# Patient Record
Sex: FEMALE | Race: WHITE | NOT HISPANIC OR LATINO | Employment: OTHER | URBAN - METROPOLITAN AREA
[De-identification: names, ages, dates, MRNs, and addresses within clinical notes are randomized per-mention and may not be internally consistent; named-entity substitution may affect disease eponyms.]

---

## 2023-02-13 PROBLEM — G89.4 CHRONIC PAIN SYNDROME: Status: ACTIVE | Noted: 2023-02-13

## 2023-02-13 PROBLEM — G89.29 CHRONIC LEFT SI JOINT PAIN: Status: ACTIVE | Noted: 2023-02-13

## 2023-02-13 PROBLEM — R91.1 LUNG NODULE SEEN ON IMAGING STUDY: Status: ACTIVE | Noted: 2023-02-13

## 2023-02-13 PROBLEM — M79.2 PERIPHERAL NEUROPATHIC PAIN: Status: ACTIVE | Noted: 2023-02-13

## 2023-02-13 PROBLEM — R22.41 MASS OF LEG, RIGHT: Status: ACTIVE | Noted: 2023-02-13

## 2023-02-13 PROBLEM — M79.89 SOFT TISSUE MASS: Status: ACTIVE | Noted: 2023-02-13

## 2023-02-13 PROBLEM — I83.813 VARICOSE VEINS OF BOTH LOWER EXTREMITIES WITH PAIN: Status: ACTIVE | Noted: 2023-02-13

## 2023-02-13 PROBLEM — N39.0 CHRONIC UTI: Status: ACTIVE | Noted: 2023-02-13

## 2023-02-13 PROBLEM — L53.9 BREAST ERYTHEMA: Status: ACTIVE | Noted: 2023-02-13

## 2023-02-13 PROBLEM — R10.2 VAGINAL PAIN: Status: ACTIVE | Noted: 2023-02-13

## 2023-02-13 PROBLEM — M50.30 DEGENERATION OF INTERVERTEBRAL DISC OF CERVICAL REGION: Status: ACTIVE | Noted: 2023-02-13

## 2023-02-13 PROBLEM — M46.1 SACROILIITIS (CMS-HCC): Status: ACTIVE | Noted: 2023-02-13

## 2023-02-13 PROBLEM — M85.80 OSTEOPENIA: Status: ACTIVE | Noted: 2023-02-13

## 2023-02-13 PROBLEM — R39.9 UTI SYMPTOMS: Status: ACTIVE | Noted: 2023-02-13

## 2023-02-13 PROBLEM — K27.3 PEPTIC ULCER, ACUTE: Status: ACTIVE | Noted: 2023-02-13

## 2023-02-13 PROBLEM — M25.552 LEFT HIP PAIN: Status: ACTIVE | Noted: 2023-02-13

## 2023-02-13 PROBLEM — K30 GASTRIC MOTILITY DISORDER: Status: ACTIVE | Noted: 2023-02-13

## 2023-02-13 PROBLEM — R39.16 URINARY STRAINING: Status: ACTIVE | Noted: 2023-02-13

## 2023-02-13 PROBLEM — G25.81 RLS (RESTLESS LEGS SYNDROME): Status: ACTIVE | Noted: 2023-02-13

## 2023-02-13 PROBLEM — N64.59 INVERSION, NIPPLE: Status: ACTIVE | Noted: 2023-02-13

## 2023-02-13 PROBLEM — N76.0 VAGINITIS: Status: ACTIVE | Noted: 2023-02-13

## 2023-02-13 PROBLEM — G47.00 INSOMNIA: Status: ACTIVE | Noted: 2023-02-13

## 2023-02-13 PROBLEM — R93.1 AGATSTON CAC SCORE, <100: Status: ACTIVE | Noted: 2023-02-13

## 2023-02-13 PROBLEM — M53.3 CHRONIC LEFT SI JOINT PAIN: Status: ACTIVE | Noted: 2023-02-13

## 2023-02-13 PROBLEM — R11.0 NAUSEA IN ADULT: Status: ACTIVE | Noted: 2023-02-13

## 2023-02-13 PROBLEM — C50.911 INVASIVE DUCTAL CARCINOMA OF BREAST, RIGHT (MULTI): Status: ACTIVE | Noted: 2023-02-13

## 2023-02-13 PROBLEM — M51.369 DEGENERATION OF INTERVERTEBRAL DISC OF LUMBAR REGION: Status: ACTIVE | Noted: 2023-02-13

## 2023-02-13 PROBLEM — I87.2 VENOUS INSUFFICIENCY: Status: ACTIVE | Noted: 2023-02-13

## 2023-02-13 PROBLEM — R10.2 PELVIC PAIN IN FEMALE: Status: ACTIVE | Noted: 2023-02-13

## 2023-02-13 PROBLEM — M79.18 BUTTOCK PAIN: Status: ACTIVE | Noted: 2023-02-13

## 2023-02-13 PROBLEM — N39.0 URINARY TRACT INFECTION: Status: ACTIVE | Noted: 2023-02-13

## 2023-02-13 PROBLEM — G62.9 SMALL FIBER NEUROPATHY: Status: ACTIVE | Noted: 2023-02-13

## 2023-02-13 PROBLEM — M79.18 MYOFASCIAL PAIN: Status: ACTIVE | Noted: 2023-02-13

## 2023-02-13 PROBLEM — M21.70 LEG LENGTH DISCREPANCY: Status: ACTIVE | Noted: 2023-02-13

## 2023-02-13 PROBLEM — M51.36 DEGENERATION OF INTERVERTEBRAL DISC OF LUMBAR REGION: Status: ACTIVE | Noted: 2023-02-13

## 2023-02-13 PROBLEM — N84.0 UTERINE POLYP: Status: ACTIVE | Noted: 2023-02-13

## 2023-02-13 PROBLEM — N95.2 ATROPHY OF VAGINA: Status: ACTIVE | Noted: 2023-02-13

## 2023-02-13 PROBLEM — R39.11 URINARY HESITANCY: Status: ACTIVE | Noted: 2023-02-13

## 2023-02-13 PROBLEM — M54.50 LOW BACK PAIN: Status: ACTIVE | Noted: 2023-02-13

## 2023-02-13 PROBLEM — J31.0 CHRONIC RHINITIS: Status: ACTIVE | Noted: 2023-02-13

## 2023-02-13 PROBLEM — E78.00 HYPERCHOLESTEROLEMIA: Status: ACTIVE | Noted: 2023-02-13

## 2023-02-13 RX ORDER — GABAPENTIN 100 MG/1
1 CAPSULE ORAL 2 TIMES DAILY
COMMUNITY
Start: 2022-02-21 | End: 2023-11-14 | Stop reason: SDUPTHER

## 2023-02-13 RX ORDER — ROPINIROLE 1 MG/1
1 TABLET, FILM COATED ORAL NIGHTLY
COMMUNITY
Start: 2017-08-16 | End: 2023-07-28 | Stop reason: SDUPTHER

## 2023-07-28 DIAGNOSIS — G25.81 RLS (RESTLESS LEGS SYNDROME): Primary | ICD-10-CM

## 2023-07-31 RX ORDER — ROPINIROLE 1 MG/1
1 TABLET, FILM COATED ORAL NIGHTLY
Qty: 90 TABLET | Refills: 2 | Status: SHIPPED | OUTPATIENT
Start: 2023-07-31 | End: 2023-08-03 | Stop reason: SDUPTHER

## 2023-08-03 ENCOUNTER — TELEPHONE (OUTPATIENT)
Dept: PRIMARY CARE | Facility: CLINIC | Age: 68
End: 2023-08-03
Payer: MEDICARE

## 2023-08-03 DIAGNOSIS — G25.81 RLS (RESTLESS LEGS SYNDROME): ICD-10-CM

## 2023-08-03 RX ORDER — ROPINIROLE 1 MG/1
1 TABLET, FILM COATED ORAL NIGHTLY
Qty: 90 TABLET | Refills: 2 | Status: SHIPPED | OUTPATIENT
Start: 2023-08-03 | End: 2024-02-29

## 2023-08-03 NOTE — TELEPHONE ENCOUNTER
Patient called advised her script for Ropinirole 1 mg should have been sent to [facility] requesting to have new script sent.

## 2023-08-07 ENCOUNTER — OFFICE VISIT (OUTPATIENT)
Dept: PRIMARY CARE | Facility: CLINIC | Age: 68
End: 2023-08-07
Payer: MEDICARE

## 2023-08-07 VITALS
HEIGHT: 62 IN | DIASTOLIC BLOOD PRESSURE: 72 MMHG | HEART RATE: 81 BPM | BODY MASS INDEX: 20.43 KG/M2 | OXYGEN SATURATION: 98 % | WEIGHT: 111 LBS | SYSTOLIC BLOOD PRESSURE: 118 MMHG

## 2023-08-07 DIAGNOSIS — G89.4 CHRONIC PAIN SYNDROME: ICD-10-CM

## 2023-08-07 DIAGNOSIS — E78.00 HYPERCHOLESTEROLEMIA: ICD-10-CM

## 2023-08-07 DIAGNOSIS — G25.81 RLS (RESTLESS LEGS SYNDROME): ICD-10-CM

## 2023-08-07 DIAGNOSIS — M85.80 OSTEOPENIA, UNSPECIFIED LOCATION: ICD-10-CM

## 2023-08-07 DIAGNOSIS — M46.1 SACROILIITIS (CMS-HCC): ICD-10-CM

## 2023-08-07 DIAGNOSIS — H01.00B BLEPHARITIS OF BOTH UPPER AND LOWER EYELID OF LEFT EYE, UNSPECIFIED TYPE: ICD-10-CM

## 2023-08-07 DIAGNOSIS — R35.0 URINARY FREQUENCY: ICD-10-CM

## 2023-08-07 DIAGNOSIS — C50.911 INVASIVE DUCTAL CARCINOMA OF BREAST, RIGHT (MULTI): ICD-10-CM

## 2023-08-07 DIAGNOSIS — E46 PROTEIN-CALORIE MALNUTRITION, UNSPECIFIED SEVERITY (MULTI): Primary | ICD-10-CM

## 2023-08-07 PROCEDURE — 99214 OFFICE O/P EST MOD 30 MIN: CPT | Performed by: INTERNAL MEDICINE

## 2023-08-07 PROCEDURE — 1126F AMNT PAIN NOTED NONE PRSNT: CPT | Performed by: INTERNAL MEDICINE

## 2023-08-07 PROCEDURE — 1160F RVW MEDS BY RX/DR IN RCRD: CPT | Performed by: INTERNAL MEDICINE

## 2023-08-07 PROCEDURE — 1036F TOBACCO NON-USER: CPT | Performed by: INTERNAL MEDICINE

## 2023-08-07 PROCEDURE — 1157F ADVNC CARE PLAN IN RCRD: CPT | Performed by: INTERNAL MEDICINE

## 2023-08-07 PROCEDURE — 1159F MED LIST DOCD IN RCRD: CPT | Performed by: INTERNAL MEDICINE

## 2023-08-07 RX ORDER — CEPHALEXIN 250 MG/1
250 CAPSULE ORAL 4 TIMES DAILY
Qty: 28 CAPSULE | Refills: 0 | Status: SHIPPED | OUTPATIENT
Start: 2023-08-07 | End: 2023-08-14

## 2023-08-07 RX ORDER — CHOLESTYRAMINE 4 G/4.8G
POWDER, FOR SUSPENSION ORAL
COMMUNITY
Start: 2023-07-24

## 2023-08-07 RX ORDER — NEOMYCIN SULFATE, POLYMYXIN B SULFATE AND DEXAMETHASONE 3.5; 10000; 1 MG/ML; [USP'U]/ML; MG/ML
SUSPENSION/ DROPS OPHTHALMIC
COMMUNITY
Start: 2022-11-07

## 2023-08-07 ASSESSMENT — ENCOUNTER SYMPTOMS
FATIGUE: 0
DIZZINESS: 0
SHORTNESS OF BREATH: 0
ABDOMINAL PAIN: 0

## 2023-08-07 NOTE — PROGRESS NOTES
"Subjective   Patient ID: Nery Adan is a 67 y.o. female who presents for Follow-up chronic medical problems.    Cancer free 5 years.  Followed yearly.  Taking gabapentin 200 mg bedtime for pain and sleep.  May increase to 400 mg if going to work next day.  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  Has requip for RLS, 1/2 to 1 tab bedtime.  Chronic blepharitis, saw UC and placed on keflex and eye drops.  Meds and labs reviewed       Review of Systems   Constitutional:  Negative for fatigue.   Respiratory:  Negative for shortness of breath.    Cardiovascular:  Negative for chest pain.   Gastrointestinal:  Negative for abdominal pain.   Neurological:  Negative for dizziness.       Objective   /72 (BP Location: Left arm, Patient Position: Sitting)   Pulse 81   Ht 1.575 m (5' 2\")   Wt 50.3 kg (111 lb)   SpO2 98%   BMI 20.30 kg/m²     Physical Exam  Constitutional:       Appearance: Normal appearance.   Cardiovascular:      Rate and Rhythm: Normal rate and regular rhythm.      Pulses: Normal pulses.      Heart sounds: Normal heart sounds.   Pulmonary:      Effort: Pulmonary effort is normal.      Breath sounds: Normal breath sounds.   Abdominal:      General: Abdomen is flat.      Palpations: Abdomen is soft.   Neurological:      General: No focal deficit present.      Mental Status: She is alert.   Psychiatric:         Mood and Affect: Mood normal.         Behavior: Behavior normal.         Thought Content: Thought content normal.         Judgment: Judgment normal.         Assessment/Plan   Problem List Items Addressed This Visit          Cardiac and Vasculature    Hypercholesterolemia     Lipids, LDL not at goal without statin.  Recommend low fat/cholesterol diet.           Relevant Orders    CBC and Auto Differential    Comprehensive metabolic panel    Lipid Panel    TSH with reflex to Free T4 if abnormal       Endocrine/Metabolic    Protein-calorie malnutrition, unspecified severity (CMS/HCC) - Primary     BMI " 20, 3 meals daily.            Eye    Blepharitis of both upper and lower eyelid of left eye    Relevant Medications    cephalexin (Keflex) 250 mg capsule       Genitourinary and Reproductive    Urinary hesitancy       Hematology and Neoplasia    Invasive ductal carcinoma of breast, right (CMS/HCC)     Kareem, followed with oncology.         Relevant Orders    CBC and Auto Differential    Comprehensive metabolic panel       Musculoskeletal and Injuries    Osteopenia    Relevant Orders    Vitamin D 25-Hydroxy,Total    Sacroiliitis (CMS/HCC)     Pain improved wit gabapentin and HEP.            Neuro    Chronic pain syndrome     Improved with gabapentin and HEP.         RLS (restless legs syndrome)     Improved with requip.

## 2023-10-30 ENCOUNTER — OFFICE VISIT (OUTPATIENT)
Dept: PRIMARY CARE | Facility: CLINIC | Age: 68
End: 2023-10-30
Payer: MEDICARE

## 2023-10-30 VITALS
SYSTOLIC BLOOD PRESSURE: 110 MMHG | BODY MASS INDEX: 20.43 KG/M2 | WEIGHT: 111 LBS | OXYGEN SATURATION: 98 % | HEART RATE: 77 BPM | HEIGHT: 62 IN | DIASTOLIC BLOOD PRESSURE: 66 MMHG

## 2023-10-30 DIAGNOSIS — G89.29 CHRONIC LEFT SI JOINT PAIN: ICD-10-CM

## 2023-10-30 DIAGNOSIS — G25.81 RLS (RESTLESS LEGS SYNDROME): ICD-10-CM

## 2023-10-30 DIAGNOSIS — H25.9 AGE-RELATED CATARACT OF BOTH EYES, UNSPECIFIED AGE-RELATED CATARACT TYPE: Primary | ICD-10-CM

## 2023-10-30 DIAGNOSIS — K86.1 CHRONIC CALCIFIC PANCREATITIS (MULTI): ICD-10-CM

## 2023-10-30 DIAGNOSIS — M53.3 CHRONIC LEFT SI JOINT PAIN: ICD-10-CM

## 2023-10-30 PROBLEM — N76.0 VAGINITIS: Status: RESOLVED | Noted: 2023-02-13 | Resolved: 2023-10-30

## 2023-10-30 PROBLEM — K27.3 PEPTIC ULCER, ACUTE: Status: RESOLVED | Noted: 2023-02-13 | Resolved: 2023-10-30

## 2023-10-30 PROBLEM — R10.2 PELVIC PAIN IN FEMALE: Status: RESOLVED | Noted: 2023-02-13 | Resolved: 2023-10-30

## 2023-10-30 PROBLEM — R39.9 UTI SYMPTOMS: Status: RESOLVED | Noted: 2023-02-13 | Resolved: 2023-10-30

## 2023-10-30 PROBLEM — R10.2 VAGINAL PAIN: Status: RESOLVED | Noted: 2023-02-13 | Resolved: 2023-10-30

## 2023-10-30 PROBLEM — R11.0 NAUSEA IN ADULT: Status: RESOLVED | Noted: 2023-02-13 | Resolved: 2023-10-30

## 2023-10-30 PROBLEM — N39.0 URINARY TRACT INFECTION: Status: RESOLVED | Noted: 2023-02-13 | Resolved: 2023-10-30

## 2023-10-30 PROBLEM — R39.11 URINARY HESITANCY: Status: RESOLVED | Noted: 2023-02-13 | Resolved: 2023-10-30

## 2023-10-30 PROBLEM — R39.16 URINARY STRAINING: Status: RESOLVED | Noted: 2023-02-13 | Resolved: 2023-10-30

## 2023-10-30 PROCEDURE — 1126F AMNT PAIN NOTED NONE PRSNT: CPT | Performed by: INTERNAL MEDICINE

## 2023-10-30 PROCEDURE — 1160F RVW MEDS BY RX/DR IN RCRD: CPT | Performed by: INTERNAL MEDICINE

## 2023-10-30 PROCEDURE — 1159F MED LIST DOCD IN RCRD: CPT | Performed by: INTERNAL MEDICINE

## 2023-10-30 PROCEDURE — 1036F TOBACCO NON-USER: CPT | Performed by: INTERNAL MEDICINE

## 2023-10-30 PROCEDURE — 99214 OFFICE O/P EST MOD 30 MIN: CPT | Performed by: INTERNAL MEDICINE

## 2023-10-30 ASSESSMENT — ENCOUNTER SYMPTOMS
SHORTNESS OF BREATH: 0
FATIGUE: 0
ABDOMINAL PAIN: 0
DIZZINESS: 0

## 2023-10-30 NOTE — ASSESSMENT & PLAN NOTE
Scheduled for cataract surgery.  Ms. Nery Adan's medical conditions are optimized and she may proceed with planned surgical procedure.

## 2023-10-30 NOTE — PROGRESS NOTES
"Subjective   Patient ID: Nery Adan is a 68 y.o. female who presents for Pre-op Exam (Cataract surgery Dr Ruiz [facility] R-11/13, L-11/27) and chronic medical problems.    2nd opinion for eyes.  Scheduled for cataract surgery.  No anesthetic problems with previous surgeries.  No latex allergies.  Chronic pain rusty left SI joint area.  Tolerating gabapentin with no side effects.  Requip 1/2 to 1 mg for RLS.  Sleep varies, 6 hours on average.  No new health issues.  Meds and labs reviewed.       Review of Systems   Constitutional:  Negative for fatigue.   Respiratory:  Negative for shortness of breath.    Cardiovascular:  Negative for chest pain.   Gastrointestinal:  Negative for abdominal pain.   Musculoskeletal:         Left back and hip pain.   Neurological:  Negative for dizziness.       Objective   /66 (BP Location: Left arm, Patient Position: Sitting)   Pulse 77   Ht 1.575 m (5' 2\")   Wt 50.3 kg (111 lb)   SpO2 98%   BMI 20.30 kg/m²     Physical Exam  Constitutional:       Appearance: Normal appearance.   Neck:      Vascular: No carotid bruit.   Cardiovascular:      Rate and Rhythm: Normal rate and regular rhythm.      Pulses: Normal pulses.      Heart sounds: Normal heart sounds.   Pulmonary:      Effort: Pulmonary effort is normal.      Breath sounds: Normal breath sounds.   Neurological:      General: No focal deficit present.      Mental Status: She is alert.   Psychiatric:         Mood and Affect: Mood normal.         Behavior: Behavior normal.         Thought Content: Thought content normal.         Judgment: Judgment normal.         Assessment/Plan   Problem List Items Addressed This Visit             ICD-10-CM    Chronic left SI joint pain M53.3, G89.29     Pain rusty with gabapentin and HEP.         RLS (restless legs syndrome) G25.81     Condition improved with requip.         Chronic calcific pancreatitis (CMS/[facility]) K86.1     Not an active problem.  Last " symptomatic 10 years ago.  Ct 5-2020 w/o acute changes.         Age-related cataract of both eyes - Primary H25.9     Scheduled for cataract surgery.  Ms. Nery Adan's medical conditions are optimized and she may proceed with planned surgical procedure.

## 2023-11-14 DIAGNOSIS — G89.4 CHRONIC PAIN SYNDROME: Primary | ICD-10-CM

## 2023-11-15 RX ORDER — GABAPENTIN 100 MG/1
100 CAPSULE ORAL 2 TIMES DAILY
Qty: 180 CAPSULE | Refills: 1 | Status: SHIPPED | OUTPATIENT
Start: 2023-11-15 | End: 2024-02-15 | Stop reason: SDUPTHER

## 2023-11-15 NOTE — TELEPHONE ENCOUNTER
Pt called in on gabapentin.  Sts it was Rxd by a specialist who passed suddenly and letter sts office is closing permanently.  Sts she takes gabapentin 100mg 2Qam, 2Qnoon, 2Qpm, 2QHS.  Can the script be updated to this and sent in?

## 2024-01-11 ENCOUNTER — TELEPHONE (OUTPATIENT)
Dept: PRIMARY CARE | Facility: CLINIC | Age: 69
End: 2024-01-11
Payer: MEDICARE

## 2024-01-11 DIAGNOSIS — N39.0 URINARY TRACT INFECTION WITHOUT HEMATURIA, SITE UNSPECIFIED: Primary | ICD-10-CM

## 2024-01-11 RX ORDER — SULFAMETHOXAZOLE AND TRIMETHOPRIM 800; 160 MG/1; MG/1
1 TABLET ORAL 2 TIMES DAILY
Qty: 14 TABLET | Refills: 0 | Status: SHIPPED | OUTPATIENT
Start: 2024-01-11 | End: 2024-01-18

## 2024-01-11 NOTE — TELEPHONE ENCOUNTER
Pt has uti went to [facility] on 1/7 they gave her macrobid which really hasn't worked, PT STILL IS HAVING BURNING WITH URINATION ,  pt is requesting BACTRIM, PLEASE ADVISE

## 2024-01-19 ENCOUNTER — TELEPHONE (OUTPATIENT)
Dept: ADMISSION | Facility: HOSPITAL | Age: 69
End: 2024-01-19
Payer: MEDICARE

## 2024-01-19 DIAGNOSIS — C50.911 INVASIVE DUCTAL CARCINOMA OF BREAST, RIGHT (MULTI): Primary | ICD-10-CM

## 2024-01-19 NOTE — TELEPHONE ENCOUNTER
Per Linda, patient can be moved to Monday, patient agreeable to 8AM on Monday with Linda at [facility], per APRN, this apt does not need to be an hour and can just be 30 minutes, order placed to reflect this and this was routed to scheduling to [facility]

## 2024-01-19 NOTE — TELEPHONE ENCOUNTER
The patient called in, slightly distressed, she has one remaining breast (the left breast), she noticed a  red and raised acne looking pimple around Tuesday on the left breast, she put some acne cream on it and now there is a rash around the acne loida looking like chicken skin. There is no lump or bump, it is not warm to touch, not painful however she had inflammatory breast cancer on the right so if rightfully very nervous by the progression of this area. She does have an apt with Linda Wu on 1/26 at [facility] however wondering if any testing should be done prior to investigate this further. Message routed to the team.

## 2024-01-21 PROBLEM — Z85.3 ENCOUNTER FOR FOLLOW-UP SURVEILLANCE OF BREAST CANCER: Status: ACTIVE | Noted: 2024-01-21

## 2024-01-21 PROBLEM — Z08 ENCOUNTER FOR FOLLOW-UP SURVEILLANCE OF BREAST CANCER: Status: ACTIVE | Noted: 2024-01-21

## 2024-01-21 NOTE — PROGRESS NOTES
Patient ID: Nery Adan is a 68 y.o. female.  BREAST CANCER DIAGNOSIS:        Breast         AJCC Edition: 8th (AJCC), Diagnosis Date: Apr 2018, IIIC, cT4d cN1 cM0 G3        Treatment History:    1. She developed right breast mass and redness that did not respond to antibiotics 4/2018.   2. Core biopsy right breast 4/20/18 showed invasive ductal  carcinoma grade 3 ER 0% NC 0% HER2 2+, not amplified by dual LINDSAY. Skin biopsy negative but staged [id number]. She had an enlarged right axillary  LN and indeterminate liver lesions, cleared with liver MRI.  3. She had preoperative chemotherapy with Adriamycin/Cytoxan followed by weekly Paclitaxel initiated 7/6/18 and completed 11/2/18. Treatment complicated  by PE 11/2018. IVC filter plaed before surgery.  4. Right mastectomy and ALN 12/13/18 showed no residual breast disease and 0/8 ALN, pCR.   5. Right chest wall and jose raul radiation completed from 1/21/19 - 3/7/19  6. No adjuvant capecitabine recommended with pCR. Observation planned.       Past Medical History: Nery has a past medical history of Abnormal weight loss (04/29/2020), Acute vaginitis (10/17/2019), Breast cancer (CMS/HCC) (01/01/2018), Contact with and (suspected) exposure to covid-19 (12/14/2020), COVID-19 (06/22/2022), Encounter for follow-up examination after completed treatment for conditions other than malignant neoplasm (02/25/2020), Follicular disorder, unspecified (04/20/2021), Headache, unspecified (12/14/2020), Left lower quadrant abdominal swelling, mass and lump (05/08/2020), Left lower quadrant pain (05/21/2020), Mastitis without abscess, Other conditions influencing health status (02/21/2022), Other specified disorders of nose and nasal sinuses (03/25/2021), Pain in right leg (10/25/2021), Pain in unspecified finger(s) (04/26/2021), Personal history of COVID-19, Personal history of other diseases of the respiratory system (04/20/2021), Personal history of other diseases of the  respiratory system (2020), Personal history of other drug therapy, Personal history of other infectious and parasitic diseases (2020), Personal history of other infectious and parasitic diseases (2018), Personal history of other mental and behavioral disorders (10/01/2019), Personal history of other specified conditions (2021), Personal history of other specified conditions (2019), Personal history of other specified conditions (2020), Postnasal drip (2020), Unspecified blepharitis left eye, unspecified eyelid (2022), Unspecified mononeuropathy of left lower limb, and Unspecified mononeuropathy of right lower limb.  Surgical History:  Nery has a past surgical history that includes Other surgical history (10/30/2019); Rotator cuff repair (05/15/2018);  section, classic (05/15/2018); Gallbladder surgery (05/15/2018); Gastric fundoplication (05/15/2018); Other surgical history (2019); Colonoscopy (2020); and Breast biopsy (Left, 1967).  Social History:  Nery reports that she has never smoked. She has never used smokeless tobacco. She reports current alcohol use. She reports that she does not use drugs.  Social Substance History:  ·  Smoking Status never smoker   ·  Additional History     , had 3 pregnancies and 2 children. Son  of heroin overdose. daughter is alive. No history of smoking, alcohol abuse or drug abuse. 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 First pregnancy at 19. Did not breast-feed her children.      Past medical history:     Neuropathy of left foot     Neuropathy of right foot.     Past surgical history:      ×2, in  and .     History of esophagogastric fundoplasty Niesen fundoplication in      Cholecystectomy in      Right rotator cuff repair  in , left in 2017    Family History:    Family History   Problem Relation Name Age of Onset    Diabetes Mother      COPD Mother      Diabetes Father      Other (BLADDER CANCER) Father      Heart attack Father      Diabetes Sister      Other (CARDIAC DISORDER) Sister      Diabetes Brother      Other (Other) Brother      Kidney cancer Maternal Grandmother      Lung cancer Maternal Grandfather      Hodgkin's lymphoma Other MATERNAL RELATIVES      Family Oncology History:  Cancer-related family history includes Kidney cancer in her maternal grandmother; Lung cancer in her maternal grandfather.    HISTORY OF PRESENT ILLNESS:  Nery Adan is a 68 y.o. female who presents today for a problem focused visit. She reports a rash on the left breast that came on last week- first there was blemish- She used an otc acne treatment. The blemish did not disappear and after a few days of using the topical treatment she experienced a thickening of the skin with appearance of capillaries. She She reports after the blemish there came itching. She reports the area of concern extended up the breast but since 24 this has calmed down. She denies any new lotions, creams, detergents, new fabrics. She denies any illness, fever or chills. She denies any no injuries. She denies any lumps or masses in the area, no nipple discharge. She reports feeling well and has no other concerns. She denies any change in breathing, neurological changes, new bone pains or aches, changes in appetite or bowels, or any significant fatigue        Review of Systems - Oncology  ROS 14 points performed, See HPI for exceptions   OBJECTIVE:  VS / Pain:  /71   Pulse (!) 112   Temp 36.5 °C (97.7 °F)   Resp 18   SpO2 99%   BSA: There is no height or weight on file to calculate BSA.   Pain Scale: 0  ECO- Fully active, able to carry on all pre-disease performance w/o restriction.      Performance Status:       Physical Exam  Constitutional: Well  developed, awake/alert/oriented x4, no distress, alert and cooperative  EYES: Sclera clear  ENMT: mucous membranes moist, no apparent injury, no lesions seen  Head/Neck: Neck supple, no apparent injury, thyroid without mass or tenderness, No JVD, trachea midline, no bruits  Respiratory / Thoracic: Patent airways, clear to all lobes, normal breath sounds with good chest expansion, thorax symmetric.  Cardiovascular: Regular, rate and rhythm, no murmurs, 2+ equal pulses of the extremities, normal auscultated S 1and S 2  GI: Nondistended, soft, non-tender, no rebound tenderness or guarding, no masses palpable, no organomegaly, +BS, no bruits  Musculoskeletal: ROM intact, no joint swelling, normal strength, no spinal tenderness  Extremities: normal extremities, no cyanosis edema, contusions or wounds, no clubbing  Neurological: alert and oriented x4, intact senses, motor, response and reflexes, normal strength  Breast: Right mastectomy without reconstruction free of any palpable masses or lesions.  Left breast dried punctum <0.25cm at 2-3:00 4cm FN with new mild erythremia new scattered petechiae with mild increase in skin dryness. Mild thickening of tissue with exam without any palpable masses   Lymphatic: No cervical, supraclavicular, infraclavicular or axillary lymphadenopathy  Psychological: Appropriate and talkative mood and behavior  Skin: Warm and dry, no lesions, no rashes, no jaundice    Diagnostic Results   BI US breast limited left, BI mammo left diagnostic tomosynthesis  Narrative: Interpreted By:  Lisa Hughes,  and Miesha Wilson   STUDY:  BI MAMMO LEFT DIAGNOSTIC TOMOSYNTHESIS; BI US BREAST LIMITED LEFT;  1/22/2024 9:34 am; 1/22/2024 9:47 am      ACCESSION NUMBER(S):  [id number]; [id number]      ORDERING CLINICIAN:  DIEGO MCGARRY      INDICATION:  History of right inflammatory breast cancer, status post mastectomy.  New rash and pain in the left breast.      COMPARISON:  Mammogram 01/22/2024,  07/19/2023      FINDINGS:  MAMMOGRAPHY: 2D and tomosynthesis images were reviewed at 1 mm slice  thickness.      Density:  The breast tissue is heterogeneously dense, which may  obscure small masses.      A metal radiopaque marker was placed on the skin at the site of the  patient's pain and skin discoloration in the superolateral left  breast. No focal mammographic abnormality is seen underneath the  radiopaque marker.  No suspicious masses or calcifications are  identified.      ULTRASOUND:  Targeted ultrasound of the left breast was performed  with elastography. No abnormalities were identified corresponding to  the marked area skin discoloration and pain.      Impression: No mammographic or sonographic evidence of malignancy. Clinical  follow-up is recommended for the rash and pain in the left breast.      BI-RADS CATEGORY:      BI-RADS Category:  1 Negative.  Recommendation:  Clinical follow-up.  Recommended Date:  N/A.  Laterality:  Left.  For any future breast imaging appointments, please call [phone]).      I personally reviewed the images/study and I agree with the findings  as stated. This study was interpreted at [place], [place].      MACRO:  None      Signed by: Lisa Hughes 1/22/2024 11:09 AM  Dictation workstation:   [id number]  Impression:     Essentially stable CT chest including post treatment changes in the  right chest and 3 mm indeterminate left lung nodule. No significant  change from 11/19/2019, as detailed above.      CT Chest without Contrast [Jun 20 2023  9:05AM]    Lab on 01/22/2024   Component Date Value Ref Range Status    Glucose 01/22/2024 89  74 - 99 mg/dL Final    Sodium 01/22/2024 141  136 - 145 mmol/L Final    Potassium 01/22/2024 3.7  3.5 - 5.3 mmol/L Final    Chloride 01/22/2024 104  98 - 107 mmol/L Final    Bicarbonate 01/22/2024 26  21 - 32 mmol/L Final    Anion Gap 01/22/2024 15  10 - 20 mmol/L Final    Urea  Nitrogen 01/22/2024 16  6 - 23 mg/dL Final    Creatinine 01/22/2024 0.50  0.50 - 1.05 mg/dL Final    eGFR 01/22/2024 >90  >60 mL/min/1.73m*2 Final    Calcium 01/22/2024 8.7  8.6 - 10.3 mg/dL Final    Albumin 01/22/2024 4.5  3.4 - 5.0 g/dL Final    Alkaline Phosphatase 01/22/2024 61  33 - 136 U/L Final    Total Protein 01/22/2024 6.8  6.4 - 8.2 g/dL Final    AST 01/22/2024 26  9 - 39 U/L Final    Bilirubin, Total 01/22/2024 0.6  0.0 - 1.2 mg/dL Final    ALT 01/22/2024 23  7 - 45 U/L Final    WBC 01/22/2024 5.8  4.4 - 11.3 x10*3/uL Final    nRBC 01/22/2024 0.0  0.0 - 0.0 /100 WBCs Final    RBC 01/22/2024 4.57  4.00 - 5.20 x10*6/uL Final    Hemoglobin 01/22/2024 14.3  12.0 - 16.0 g/dL Final    Hematocrit 01/22/2024 43.9  36.0 - 46.0 % Final    MCV 01/22/2024 96  80 - 100 fL Final    MCH 01/22/2024 31.3  26.0 - 34.0 pg Final    MCHC 01/22/2024 32.6  32.0 - 36.0 g/dL Final    RDW 01/22/2024 13.7  11.5 - 14.5 % Final    Platelets 01/22/2024 180  150 - 450 x10*3/uL Final    Neutrophils % 01/22/2024 86.7  40.0 - 80.0 % Final    Immature Granulocytes %, Automated 01/22/2024 0.2  0.0 - 0.9 % Final    Lymphocytes % 01/22/2024 6.7  13.0 - 44.0 % Final    Monocytes % 01/22/2024 6.0  2.0 - 10.0 % Final    Eosinophils % 01/22/2024 0.2  0.0 - 6.0 % Final    Basophils % 01/22/2024 0.2  0.0 - 2.0 % Final    Neutrophils Absolute 01/22/2024 5.03  1.20 - 7.70 x10*3/uL Final    Immature Granulocytes Absolute, Au* 01/22/2024 0.01  0.00 - 0.70 x10*3/uL Final    Lymphocytes Absolute 01/22/2024 0.39 (L)  1.20 - 4.80 x10*3/uL Final    Monocytes Absolute 01/22/2024 0.35  0.10 - 1.00 x10*3/uL Final    Eosinophils Absolute 01/22/2024 0.01  0.00 - 0.70 x10*3/uL Final    Basophils Absolute 01/22/2024 0.01  0.00 - 0.10 x10*3/uL Final        Assessment/Plan   Invasive ductal carcinoma of breast, right (CMS/HCC), Clinical: Stage IIIC (cT4d, cN1, cM0, G3, ER-, SD-, HER2-)  Nery was seen today for follow-up.  Diagnoses and all orders for this  visit:  Invasive ductal carcinoma of breast, right (CMS/HCC) (Primary)  -     Clinic Appointment Request Follow Up; DIEGO MCGARRY; [facility] LISA [id number]  -     Cancel: BI mammo left diagnostic; Future  -     BI US breast limited left; Future  -     Comprehensive Metabolic Panel; Future  -     CBC and Auto Differential; Future  -     Clinic Appointment Request Follow up; DIEGO MCGARRY; Future  -     MR breast bilateral w contrast full protocol; Future  -     Clinic Appointment Request Follow up; DIEGO MCGARRY; Future  Breast erythema  -     Cancel: BI mammo left diagnostic; Future  -     BI US breast limited left; Future  -     Comprehensive Metabolic Panel; Future  -     CBC and Auto Differential; Future  -     Clinic Appointment Request Follow up; DIEGO MCGARRY; Future  -     MR breast bilateral w contrast full protocol; Future  Encounter for follow-up surveillance of breast cancer  -     Cancel: BI mammo left diagnostic; Future  -     BI US breast limited left; Future  -     Comprehensive Metabolic Panel; Future  -     CBC and Auto Differential; Future  -     Clinic Appointment Request Follow up; DIEGO MCGARRY; Future  -     MR breast bilateral w contrast full protocol; Future  -     Clinic Appointment Request Follow up; DIEGO MCGARRY; Future  Cyst of skin of breast, left  -     Cancel: BI mammo left diagnostic; Future  -     BI US breast limited left; Future  -     Comprehensive Metabolic Panel; Future  -     CBC and Auto Differential; Future  -     Clinic Appointment Request Follow up; DIEGO MCGARRY; Future  -     MR breast bilateral w contrast full protocol; Future  Extremely dense tissue of left breast on mammography  -     MR breast bilateral w contrast full protocol; Future    Problem List Items Addressed This Visit       Breast erythema    Relevant Orders    BI US breast limited left (Completed)    Comprehensive Metabolic Panel (Completed)    CBC and Auto Differential  (Completed)    Clinic Appointment Request Follow up; DIEGO MCGARRY    MR breast bilateral w contrast full protocol    Invasive ductal carcinoma of breast, right (CMS/HCC) - Primary    Relevant Orders    BI US breast limited left (Completed)    Comprehensive Metabolic Panel (Completed)    CBC and Auto Differential (Completed)    Clinic Appointment Request Follow up; DIEGO MCGARRY    MR breast bilateral w contrast full protocol    Clinic Appointment Request Follow up; DIEGO MCGARRY    Encounter for follow-up surveillance of breast cancer    Relevant Orders    BI US breast limited left (Completed)    Comprehensive Metabolic Panel (Completed)    CBC and Auto Differential (Completed)    Clinic Appointment Request Follow up; DIEGO MCGARRY    MR breast bilateral w contrast full protocol    Clinic Appointment Request Follow up; DIEGO MCGARRY    Extremely dense tissue of left breast on mammography    Relevant Orders    MR breast bilateral w contrast full protocol    Cyst of skin of breast, left    Relevant Orders    BI US breast limited left (Completed)    Comprehensive Metabolic Panel (Completed)    CBC and Auto Differential (Completed)    Clinic Appointment Request Follow up; DIEGO MCGARRY    MR breast bilateral w contrast full protocol      IIIC, cT4d cN1 cM0   April 2018 with RIGHT TNBC highly suggestive of inflammatory breast cancer even though the skin biopsy was negative. A breast biopsy consistent with moderately differentiated infiltrating ductal. She is s/p chemotherapy and radiation therapy. She had  right mastectomy without reconstruction 1/2019.Observational therapy for TNBC. Has completed genetic counseling- negative for any mutations or variants.       Problem focused visit today for isolated rash / acne on left breast. Negative imaging completed today. Given history of inflammatory right triple negative breast cancer + dense breast tissue with mild increase today on exam of  thickened breast tissue, we have discussed additional imaging with full contrast MRI. She is agreeable to this plan and understands I will be in touch once resulted.        Left Lung Nodule.  Called out on CT in 2019- see results. Updated CT June 2023 with stable results from 2019 initial imaging. No further imaging warranted without concerning symptoms.     General Health Maintenance   PCP DR. Jean annually and as needed        At least 35 minutes of direct consultation was spent with the patient today reviewing her cancer care plan, educating and answering questions regarding ongoing follow up, greater than 50% in counseling and coordination of care    Treatment Plan:  [No matching plan found]      Thank you for the opportunity to be involved in the care of Nery Adan.   We discussed the clinical significance of diagnosis, goals of care and treatment plan in detail.   Please do not hesitate to reach out with any questions. Thank you.     -------------------------------------------------------------------------------------------------------------------------------  Linda Wu MSN, APRN, FNP-C  [facility]  Division of Medical Oncology- Breast   Collaborating Physician Dr. Kiel Palmer   Team Nurse Partners Trina Florentino, Aleisha Machuca [place] [address]  Phone: [phone]  Fax: [phone]  Available via Secure Chat    Confidential Peer Review Document-  Privilege  Privileged Pursuant to [place] Revised Code Section 2305.24, .25, .251 & .252

## 2024-01-22 ENCOUNTER — HOSPITAL ENCOUNTER (OUTPATIENT)
Dept: RADIOLOGY | Facility: HOSPITAL | Age: 69
Discharge: HOME | End: 2024-01-22
Payer: MEDICARE

## 2024-01-22 ENCOUNTER — LAB (OUTPATIENT)
Dept: LAB | Facility: HOSPITAL | Age: 69
End: 2024-01-22
Payer: MEDICARE

## 2024-01-22 ENCOUNTER — OFFICE VISIT (OUTPATIENT)
Dept: HEMATOLOGY/ONCOLOGY | Facility: HOSPITAL | Age: 69
End: 2024-01-22
Payer: MEDICARE

## 2024-01-22 VITALS
TEMPERATURE: 97.7 F | OXYGEN SATURATION: 99 % | RESPIRATION RATE: 18 BRPM | DIASTOLIC BLOOD PRESSURE: 71 MMHG | HEART RATE: 112 BPM | SYSTOLIC BLOOD PRESSURE: 113 MMHG

## 2024-01-22 DIAGNOSIS — N60.82 CYST OF SKIN OF BREAST, LEFT: ICD-10-CM

## 2024-01-22 DIAGNOSIS — Z85.3 ENCOUNTER FOR FOLLOW-UP SURVEILLANCE OF BREAST CANCER: ICD-10-CM

## 2024-01-22 DIAGNOSIS — Z08 ENCOUNTER FOR FOLLOW-UP SURVEILLANCE OF BREAST CANCER: ICD-10-CM

## 2024-01-22 DIAGNOSIS — C50.911 INVASIVE DUCTAL CARCINOMA OF BREAST, RIGHT (MULTI): Primary | ICD-10-CM

## 2024-01-22 DIAGNOSIS — L53.9 BREAST ERYTHEMA: ICD-10-CM

## 2024-01-22 DIAGNOSIS — C50.911 INVASIVE DUCTAL CARCINOMA OF BREAST, RIGHT (MULTI): ICD-10-CM

## 2024-01-22 DIAGNOSIS — R92.342 EXTREMELY DENSE TISSUE OF LEFT BREAST ON MAMMOGRAPHY: ICD-10-CM

## 2024-01-22 LAB
ALBUMIN SERPL BCP-MCNC: 4.5 G/DL (ref 3.4–5)
ALP SERPL-CCNC: 61 U/L (ref 33–136)
ALT SERPL W P-5'-P-CCNC: 23 U/L (ref 7–45)
ANION GAP SERPL CALC-SCNC: 15 MMOL/L (ref 10–20)
AST SERPL W P-5'-P-CCNC: 26 U/L (ref 9–39)
BASOPHILS # BLD AUTO: 0.01 X10*3/UL (ref 0–0.1)
BASOPHILS NFR BLD AUTO: 0.2 %
BILIRUB SERPL-MCNC: 0.6 MG/DL (ref 0–1.2)
BUN SERPL-MCNC: 16 MG/DL (ref 6–23)
CALCIUM SERPL-MCNC: 8.7 MG/DL (ref 8.6–10.3)
CHLORIDE SERPL-SCNC: 104 MMOL/L (ref 98–107)
CO2 SERPL-SCNC: 26 MMOL/L (ref 21–32)
CREAT SERPL-MCNC: 0.5 MG/DL (ref 0.5–1.05)
EGFRCR SERPLBLD CKD-EPI 2021: >90 ML/MIN/1.73M*2
EOSINOPHIL # BLD AUTO: 0.01 X10*3/UL (ref 0–0.7)
EOSINOPHIL NFR BLD AUTO: 0.2 %
ERYTHROCYTE [DISTWIDTH] IN BLOOD BY AUTOMATED COUNT: 13.7 % (ref 11.5–14.5)
GLUCOSE SERPL-MCNC: 89 MG/DL (ref 74–99)
HCT VFR BLD AUTO: 43.9 % (ref 36–46)
HGB BLD-MCNC: 14.3 G/DL (ref 12–16)
IMM GRANULOCYTES # BLD AUTO: 0.01 X10*3/UL (ref 0–0.7)
IMM GRANULOCYTES NFR BLD AUTO: 0.2 % (ref 0–0.9)
LYMPHOCYTES # BLD AUTO: 0.39 X10*3/UL (ref 1.2–4.8)
LYMPHOCYTES NFR BLD AUTO: 6.7 %
MCH RBC QN AUTO: 31.3 PG (ref 26–34)
MCHC RBC AUTO-ENTMCNC: 32.6 G/DL (ref 32–36)
MCV RBC AUTO: 96 FL (ref 80–100)
MONOCYTES # BLD AUTO: 0.35 X10*3/UL (ref 0.1–1)
MONOCYTES NFR BLD AUTO: 6 %
NEUTROPHILS # BLD AUTO: 5.03 X10*3/UL (ref 1.2–7.7)
NEUTROPHILS NFR BLD AUTO: 86.7 %
NRBC BLD-RTO: 0 /100 WBCS (ref 0–0)
PLATELET # BLD AUTO: 180 X10*3/UL (ref 150–450)
POTASSIUM SERPL-SCNC: 3.7 MMOL/L (ref 3.5–5.3)
PROT SERPL-MCNC: 6.8 G/DL (ref 6.4–8.2)
RBC # BLD AUTO: 4.57 X10*6/UL (ref 4–5.2)
SODIUM SERPL-SCNC: 141 MMOL/L (ref 136–145)
WBC # BLD AUTO: 5.8 X10*3/UL (ref 4.4–11.3)

## 2024-01-22 PROCEDURE — G0279 TOMOSYNTHESIS, MAMMO: HCPCS | Mod: LEFT SIDE | Performed by: RADIOLOGY

## 2024-01-22 PROCEDURE — 85025 COMPLETE CBC W/AUTO DIFF WBC: CPT

## 2024-01-22 PROCEDURE — 99215 OFFICE O/P EST HI 40 MIN: CPT | Performed by: NURSE PRACTITIONER

## 2024-01-22 PROCEDURE — 76982 USE 1ST TARGET LESION: CPT | Mod: LT

## 2024-01-22 PROCEDURE — 77065 DX MAMMO INCL CAD UNI: CPT | Mod: LEFT SIDE | Performed by: RADIOLOGY

## 2024-01-22 PROCEDURE — 76642 ULTRASOUND BREAST LIMITED: CPT | Mod: LT

## 2024-01-22 PROCEDURE — 77061 BREAST TOMOSYNTHESIS UNI: CPT | Mod: LT

## 2024-01-22 PROCEDURE — 1036F TOBACCO NON-USER: CPT | Performed by: NURSE PRACTITIONER

## 2024-01-22 PROCEDURE — 1160F RVW MEDS BY RX/DR IN RCRD: CPT | Performed by: NURSE PRACTITIONER

## 2024-01-22 PROCEDURE — 80053 COMPREHEN METABOLIC PANEL: CPT

## 2024-01-22 PROCEDURE — 76642 ULTRASOUND BREAST LIMITED: CPT | Mod: LEFT SIDE | Performed by: RADIOLOGY

## 2024-01-22 PROCEDURE — 1159F MED LIST DOCD IN RCRD: CPT | Performed by: NURSE PRACTITIONER

## 2024-01-22 PROCEDURE — 36415 COLL VENOUS BLD VENIPUNCTURE: CPT

## 2024-01-22 PROCEDURE — 1126F AMNT PAIN NOTED NONE PRSNT: CPT | Performed by: NURSE PRACTITIONER

## 2024-01-22 NOTE — PATIENT INSTRUCTIONS
Linda SPARKS, CNP July 2024.  I have placed orders today for a full contrast MRI. Mammogram and ultrasound did not show anything to explain inflammation.      Updated CT of the chest for Left lung nodule has not changed since 2019. You only need CT for concerning new symptoms.     PCP DR. Jean annually and as needed     Call with any questions, concerns or treatment intolerance prior to next office visit [phone].

## 2024-01-26 ENCOUNTER — APPOINTMENT (OUTPATIENT)
Dept: HEMATOLOGY/ONCOLOGY | Facility: HOSPITAL | Age: 69
End: 2024-01-26
Payer: MEDICARE

## 2024-02-05 ENCOUNTER — APPOINTMENT (OUTPATIENT)
Dept: PRIMARY CARE | Facility: CLINIC | Age: 69
End: 2024-02-05
Payer: MEDICARE

## 2024-02-15 DIAGNOSIS — G89.4 CHRONIC PAIN SYNDROME: ICD-10-CM

## 2024-02-15 RX ORDER — GABAPENTIN 100 MG/1
100 CAPSULE ORAL 4 TIMES DAILY
Qty: 360 CAPSULE | Refills: 1 | Status: SHIPPED | OUTPATIENT
Start: 2024-02-15 | End: 2024-04-03 | Stop reason: DRUGHIGH

## 2024-02-15 NOTE — TELEPHONE ENCOUNTER
Pt called in requesting a refill on gabapentin, sts taking 4 daily.  I sent the request to you for authorization, but I dont think it saved at 4daily.  Our chart showed she was taking 2 daily

## 2024-02-16 ENCOUNTER — HOSPITAL ENCOUNTER (OUTPATIENT)
Dept: RADIOLOGY | Facility: HOSPITAL | Age: 69
Discharge: HOME | End: 2024-02-16
Payer: MEDICARE

## 2024-02-16 DIAGNOSIS — N60.82 CYST OF SKIN OF BREAST, LEFT: ICD-10-CM

## 2024-02-16 DIAGNOSIS — L53.9 BREAST ERYTHEMA: ICD-10-CM

## 2024-02-16 DIAGNOSIS — Z85.3 ENCOUNTER FOR FOLLOW-UP SURVEILLANCE OF BREAST CANCER: ICD-10-CM

## 2024-02-16 DIAGNOSIS — R92.342 EXTREMELY DENSE TISSUE OF LEFT BREAST ON MAMMOGRAPHY: ICD-10-CM

## 2024-02-16 DIAGNOSIS — C50.911 INVASIVE DUCTAL CARCINOMA OF BREAST, RIGHT (MULTI): ICD-10-CM

## 2024-02-16 DIAGNOSIS — Z08 ENCOUNTER FOR FOLLOW-UP SURVEILLANCE OF BREAST CANCER: ICD-10-CM

## 2024-02-16 PROCEDURE — 77049 MRI BREAST C-+ W/CAD BI: CPT | Performed by: STUDENT IN AN ORGANIZED HEALTH CARE EDUCATION/TRAINING PROGRAM

## 2024-02-16 PROCEDURE — 77049 MRI BREAST C-+ W/CAD BI: CPT

## 2024-02-16 PROCEDURE — 2550000001 HC RX 255 CONTRASTS: Performed by: NURSE PRACTITIONER

## 2024-02-16 PROCEDURE — A9575 INJ GADOTERATE MEGLUMI 0.1ML: HCPCS | Performed by: NURSE PRACTITIONER

## 2024-02-16 RX ORDER — GADOTERATE MEGLUMINE 376.9 MG/ML
10 INJECTION INTRAVENOUS
Status: COMPLETED | OUTPATIENT
Start: 2024-02-16 | End: 2024-02-16

## 2024-02-16 RX ADMIN — GADOTERATE MEGLUMINE 10 ML: 376.9 INJECTION INTRAVENOUS at 07:55

## 2024-02-19 ENCOUNTER — TELEPHONE (OUTPATIENT)
Dept: HEMATOLOGY/ONCOLOGY | Facility: HOSPITAL | Age: 69
End: 2024-02-19
Payer: MEDICARE

## 2024-02-19 NOTE — TELEPHONE ENCOUNTER
"Called pt regarding MRI scan completed 2/16. Infomred her that there are only benign post-op findings in the right breast and the left breast was negative for masses, lesions or enhancement. She noted that the \"blemish\" has reappeared but the rash has resolved. Discussed this may be a sebaceous cyst and to use a gentle cleanser and warm compress when these appear. Educated on watching for signs of infection. She was appreciative of phone call and no further questions   "

## 2024-02-27 ENCOUNTER — TELEPHONE (OUTPATIENT)
Dept: PRIMARY CARE | Facility: CLINIC | Age: 69
End: 2024-02-27
Payer: MEDICARE

## 2024-02-27 NOTE — TELEPHONE ENCOUNTER
Pt thinks for the past 2 weeks she has been going  into afib, she is experiencing fill like her heart is jiggling in her chest or that her heart is rocking and rolling,  I set her up to see you next Tuesday at 10 am  but would like you  to please advise before

## 2024-02-29 ENCOUNTER — OFFICE VISIT (OUTPATIENT)
Dept: PRIMARY CARE | Facility: CLINIC | Age: 69
End: 2024-02-29
Payer: MEDICARE

## 2024-02-29 VITALS
SYSTOLIC BLOOD PRESSURE: 112 MMHG | DIASTOLIC BLOOD PRESSURE: 70 MMHG | BODY MASS INDEX: 20.24 KG/M2 | HEIGHT: 62 IN | OXYGEN SATURATION: 99 % | WEIGHT: 110 LBS | HEART RATE: 80 BPM

## 2024-02-29 DIAGNOSIS — G25.81 RLS (RESTLESS LEGS SYNDROME): ICD-10-CM

## 2024-02-29 DIAGNOSIS — Z86.711 HISTORY OF PULMONARY EMBOLUS (PE): ICD-10-CM

## 2024-02-29 DIAGNOSIS — R06.02 SOB (SHORTNESS OF BREATH) ON EXERTION: ICD-10-CM

## 2024-02-29 DIAGNOSIS — R07.89 CHEST TIGHTNESS: Primary | ICD-10-CM

## 2024-02-29 DIAGNOSIS — R00.2 HEART PALPITATIONS: ICD-10-CM

## 2024-02-29 PROBLEM — M46.1 SACROILIITIS (CMS-HCC): Status: RESOLVED | Noted: 2023-02-13 | Resolved: 2024-02-29

## 2024-02-29 PROBLEM — E46 PROTEIN-CALORIE MALNUTRITION, UNSPECIFIED SEVERITY (MULTI): Status: RESOLVED | Noted: 2023-08-07 | Resolved: 2024-02-29

## 2024-02-29 PROBLEM — Z85.3 HISTORY OF BREAST CANCER: Status: ACTIVE | Noted: 2023-02-13

## 2024-02-29 PROBLEM — K86.1 CHRONIC CALCIFIC PANCREATITIS (MULTI): Status: RESOLVED | Noted: 2023-10-30 | Resolved: 2024-02-29

## 2024-02-29 PROCEDURE — 1157F ADVNC CARE PLAN IN RCRD: CPT | Performed by: INTERNAL MEDICINE

## 2024-02-29 PROCEDURE — 1126F AMNT PAIN NOTED NONE PRSNT: CPT | Performed by: INTERNAL MEDICINE

## 2024-02-29 PROCEDURE — 93000 ELECTROCARDIOGRAM COMPLETE: CPT | Performed by: INTERNAL MEDICINE

## 2024-02-29 PROCEDURE — 1036F TOBACCO NON-USER: CPT | Performed by: INTERNAL MEDICINE

## 2024-02-29 PROCEDURE — 99214 OFFICE O/P EST MOD 30 MIN: CPT | Performed by: INTERNAL MEDICINE

## 2024-02-29 PROCEDURE — 1159F MED LIST DOCD IN RCRD: CPT | Performed by: INTERNAL MEDICINE

## 2024-02-29 PROCEDURE — 1160F RVW MEDS BY RX/DR IN RCRD: CPT | Performed by: INTERNAL MEDICINE

## 2024-02-29 RX ORDER — ROPINIROLE 1 MG/1
1 TABLET, FILM COATED ORAL NIGHTLY
Qty: 90 TABLET | Refills: 3 | Status: SHIPPED | OUTPATIENT
Start: 2024-02-29 | End: 2024-05-13 | Stop reason: SINTOL

## 2024-02-29 ASSESSMENT — ENCOUNTER SYMPTOMS
ABDOMINAL PAIN: 0
PALPITATIONS: 1
SHORTNESS OF BREATH: 1
ROS GI COMMENTS: NO HEARTBURN.
DIZZINESS: 0
FATIGUE: 0

## 2024-02-29 NOTE — PROGRESS NOTES
"Subjective   Patient ID: Nery Adan is a 68 y.o. female who presents for OTHER (Palpitations on and off).    Symptoms started few months ago.  Feeling of rapid heart beats, jumping out of chest.  Rarely in am, usually late afternoon, evening and when I bed.  Difficulty falling sleep.  SOB after cleaning house for 2 hours.  No CP but has feeling that chest is being squeezed.  Occurs with rest and activity.  2 cups coffee daily.  Side effect with requip reviewed with pt.  Meds and labs reviewed.       Review of Systems   Constitutional:  Negative for fatigue.   Respiratory:  Positive for shortness of breath.    Cardiovascular:  Positive for chest pain and palpitations.   Gastrointestinal:  Negative for abdominal pain.        No heartburn.   Neurological:  Negative for dizziness.       Objective   /70 (BP Location: Left arm, Patient Position: Sitting)   Pulse 80   Ht 1.575 m (5' 2\")   Wt 49.9 kg (110 lb)   SpO2 99%   BMI 20.12 kg/m²     Physical Exam  Constitutional:       Appearance: Normal appearance.   Neck:      Vascular: No carotid bruit.   Cardiovascular:      Rate and Rhythm: Normal rate and regular rhythm.      Pulses: Normal pulses.      Heart sounds: Normal heart sounds.   Pulmonary:      Effort: Pulmonary effort is normal.      Breath sounds: Normal breath sounds.   Neurological:      General: No focal deficit present.      Mental Status: She is alert.   Psychiatric:         Mood and Affect: Mood normal.         Behavior: Behavior normal.         Thought Content: Thought content normal.         Judgment: Judgment normal.       Assessment/Plan   Problem List Items Addressed This Visit             ICD-10-CM    Chest tightness - Primary R07.89     Rec EKG and cardiac stress test.  Rec otc pepcid 20 mg daily.  911 for increased CP.         Relevant Orders    ECG 12 lead (Clinic Performed)    Echocardiogram Stress Test    SOB (shortness of breath) on exertion R06.02     As already noted.      "    Relevant Orders    ECG 12 lead (Clinic Performed)    Echocardiogram Stress Test    Heart palpitations R00.2     Schedule holter monitor, 48 hours.  Cut back on caffeine.  Check fasting labs.           Relevant Orders    ECG 12 lead (Clinic Performed)    Holter or Event Cardiac Monitor    History of pulmonary embolus (PE) Z86.711     Remote during chemo for breast cancer.

## 2024-03-05 ENCOUNTER — APPOINTMENT (OUTPATIENT)
Dept: PRIMARY CARE | Facility: CLINIC | Age: 69
End: 2024-03-05
Payer: MEDICARE

## 2024-03-11 ENCOUNTER — HOSPITAL ENCOUNTER (OUTPATIENT)
Dept: CARDIOLOGY | Facility: CLINIC | Age: 69
Discharge: HOME | End: 2024-03-11
Payer: MEDICARE

## 2024-03-11 DIAGNOSIS — R00.2 HEART PALPITATIONS: ICD-10-CM

## 2024-03-11 PROCEDURE — 93225 XTRNL ECG REC<48 HRS REC: CPT

## 2024-03-11 PROCEDURE — 93227 XTRNL ECG REC<48 HR R&I: CPT | Performed by: INTERNAL MEDICINE

## 2024-03-13 ENCOUNTER — APPOINTMENT (OUTPATIENT)
Dept: HEMATOLOGY/ONCOLOGY | Facility: CLINIC | Age: 69
End: 2024-03-13
Payer: COMMERCIAL

## 2024-03-21 ENCOUNTER — APPOINTMENT (OUTPATIENT)
Dept: PRIMARY CARE | Facility: CLINIC | Age: 69
End: 2024-03-21
Payer: MEDICARE

## 2024-03-26 ENCOUNTER — HOSPITAL ENCOUNTER (OUTPATIENT)
Dept: CARDIOLOGY | Facility: CLINIC | Age: 69
Discharge: HOME | End: 2024-03-26
Payer: MEDICARE

## 2024-03-26 DIAGNOSIS — R06.02 SOB (SHORTNESS OF BREATH) ON EXERTION: ICD-10-CM

## 2024-03-26 DIAGNOSIS — R07.89 CHEST TIGHTNESS: ICD-10-CM

## 2024-03-26 PROCEDURE — 93016 CV STRESS TEST SUPVJ ONLY: CPT | Performed by: STUDENT IN AN ORGANIZED HEALTH CARE EDUCATION/TRAINING PROGRAM

## 2024-03-26 PROCEDURE — 93017 CV STRESS TEST TRACING ONLY: CPT

## 2024-03-26 PROCEDURE — 93018 CV STRESS TEST I&R ONLY: CPT | Performed by: STUDENT IN AN ORGANIZED HEALTH CARE EDUCATION/TRAINING PROGRAM

## 2024-03-26 PROCEDURE — 93350 STRESS TTE ONLY: CPT | Performed by: STUDENT IN AN ORGANIZED HEALTH CARE EDUCATION/TRAINING PROGRAM

## 2024-04-01 ENCOUNTER — LAB (OUTPATIENT)
Dept: LAB | Facility: LAB | Age: 69
End: 2024-04-01
Payer: MEDICARE

## 2024-04-01 DIAGNOSIS — M85.80 OSTEOPENIA, UNSPECIFIED LOCATION: ICD-10-CM

## 2024-04-01 DIAGNOSIS — R35.0 URINARY FREQUENCY: ICD-10-CM

## 2024-04-01 DIAGNOSIS — C50.911 INVASIVE DUCTAL CARCINOMA OF BREAST, RIGHT (MULTI): ICD-10-CM

## 2024-04-01 DIAGNOSIS — E78.00 HYPERCHOLESTEROLEMIA: ICD-10-CM

## 2024-04-01 LAB
25(OH)D3 SERPL-MCNC: 8 NG/ML (ref 30–100)
ALBUMIN SERPL BCP-MCNC: 4.2 G/DL (ref 3.4–5)
ALP SERPL-CCNC: 61 U/L (ref 33–136)
ALT SERPL W P-5'-P-CCNC: 29 U/L (ref 7–45)
ANION GAP SERPL CALC-SCNC: 13 MMOL/L (ref 10–20)
APPEARANCE UR: CLEAR
AST SERPL W P-5'-P-CCNC: 25 U/L (ref 9–39)
BASOPHILS # BLD AUTO: 0.03 X10*3/UL (ref 0–0.1)
BASOPHILS NFR BLD AUTO: 0.7 %
BILIRUB SERPL-MCNC: 0.4 MG/DL (ref 0–1.2)
BILIRUB UR STRIP.AUTO-MCNC: NEGATIVE MG/DL
BUN SERPL-MCNC: 14 MG/DL (ref 6–23)
CALCIUM SERPL-MCNC: 9.7 MG/DL (ref 8.6–10.6)
CHLORIDE SERPL-SCNC: 108 MMOL/L (ref 98–107)
CHOLEST SERPL-MCNC: 168 MG/DL (ref 0–199)
CHOLESTEROL/HDL RATIO: 2.9
CO2 SERPL-SCNC: 25 MMOL/L (ref 21–32)
COLOR UR: COLORLESS
CREAT SERPL-MCNC: 0.57 MG/DL (ref 0.5–1.05)
EGFRCR SERPLBLD CKD-EPI 2021: >90 ML/MIN/1.73M*2
EOSINOPHIL # BLD AUTO: 0.1 X10*3/UL (ref 0–0.7)
EOSINOPHIL NFR BLD AUTO: 2.3 %
ERYTHROCYTE [DISTWIDTH] IN BLOOD BY AUTOMATED COUNT: 13 % (ref 11.5–14.5)
GLUCOSE SERPL-MCNC: 80 MG/DL (ref 74–99)
GLUCOSE UR STRIP.AUTO-MCNC: NORMAL MG/DL
HCT VFR BLD AUTO: 42.8 % (ref 36–46)
HDLC SERPL-MCNC: 58 MG/DL
HGB BLD-MCNC: 13.4 G/DL (ref 12–16)
IMM GRANULOCYTES # BLD AUTO: 0.01 X10*3/UL (ref 0–0.7)
IMM GRANULOCYTES NFR BLD AUTO: 0.2 % (ref 0–0.9)
KETONES UR STRIP.AUTO-MCNC: NEGATIVE MG/DL
LDLC SERPL CALC-MCNC: 84 MG/DL
LEUKOCYTE ESTERASE UR QL STRIP.AUTO: NEGATIVE
LYMPHOCYTES # BLD AUTO: 1.2 X10*3/UL (ref 1.2–4.8)
LYMPHOCYTES NFR BLD AUTO: 28.2 %
MCH RBC QN AUTO: 30.4 PG (ref 26–34)
MCHC RBC AUTO-ENTMCNC: 31.3 G/DL (ref 32–36)
MCV RBC AUTO: 97 FL (ref 80–100)
MONOCYTES # BLD AUTO: 0.41 X10*3/UL (ref 0.1–1)
MONOCYTES NFR BLD AUTO: 9.6 %
NEUTROPHILS # BLD AUTO: 2.51 X10*3/UL (ref 1.2–7.7)
NEUTROPHILS NFR BLD AUTO: 59 %
NITRITE UR QL STRIP.AUTO: NEGATIVE
NON HDL CHOLESTEROL: 110 MG/DL (ref 0–149)
NRBC BLD-RTO: 0 /100 WBCS (ref 0–0)
PH UR STRIP.AUTO: 5.5 [PH]
PLATELET # BLD AUTO: 181 X10*3/UL (ref 150–450)
POTASSIUM SERPL-SCNC: 4.2 MMOL/L (ref 3.5–5.3)
PROT SERPL-MCNC: 6.3 G/DL (ref 6.4–8.2)
PROT UR STRIP.AUTO-MCNC: NEGATIVE MG/DL
RBC # BLD AUTO: 4.41 X10*6/UL (ref 4–5.2)
RBC # UR STRIP.AUTO: NEGATIVE /UL
SODIUM SERPL-SCNC: 142 MMOL/L (ref 136–145)
SP GR UR STRIP.AUTO: 1
TRIGL SERPL-MCNC: 128 MG/DL (ref 0–149)
TSH SERPL-ACNC: 1.99 MIU/L (ref 0.44–3.98)
UROBILINOGEN UR STRIP.AUTO-MCNC: NORMAL MG/DL
VLDL: 26 MG/DL (ref 0–40)
WBC # BLD AUTO: 4.3 X10*3/UL (ref 4.4–11.3)

## 2024-04-01 PROCEDURE — 81003 URINALYSIS AUTO W/O SCOPE: CPT

## 2024-04-01 PROCEDURE — 82306 VITAMIN D 25 HYDROXY: CPT

## 2024-04-01 PROCEDURE — 84443 ASSAY THYROID STIM HORMONE: CPT

## 2024-04-01 PROCEDURE — 80053 COMPREHEN METABOLIC PANEL: CPT

## 2024-04-01 PROCEDURE — 36415 COLL VENOUS BLD VENIPUNCTURE: CPT

## 2024-04-01 PROCEDURE — 85025 COMPLETE CBC W/AUTO DIFF WBC: CPT

## 2024-04-01 PROCEDURE — 80061 LIPID PANEL: CPT

## 2024-04-03 ENCOUNTER — OFFICE VISIT (OUTPATIENT)
Dept: PRIMARY CARE | Facility: CLINIC | Age: 69
End: 2024-04-03
Payer: MEDICARE

## 2024-04-03 VITALS
HEIGHT: 62 IN | DIASTOLIC BLOOD PRESSURE: 78 MMHG | OXYGEN SATURATION: 100 % | WEIGHT: 113 LBS | HEART RATE: 80 BPM | BODY MASS INDEX: 20.8 KG/M2 | SYSTOLIC BLOOD PRESSURE: 118 MMHG

## 2024-04-03 DIAGNOSIS — Z00.00 ROUTINE GENERAL MEDICAL EXAMINATION AT HEALTH CARE FACILITY: Primary | ICD-10-CM

## 2024-04-03 DIAGNOSIS — R79.89 LOW VITAMIN D LEVEL: ICD-10-CM

## 2024-04-03 DIAGNOSIS — I47.10 SVT (SUPRAVENTRICULAR TACHYCARDIA) (CMS-HCC): ICD-10-CM

## 2024-04-03 DIAGNOSIS — G25.81 RLS (RESTLESS LEGS SYNDROME): ICD-10-CM

## 2024-04-03 DIAGNOSIS — G89.4 CHRONIC PAIN SYNDROME: ICD-10-CM

## 2024-04-03 DIAGNOSIS — K91.1 DUMPING SYNDROME: ICD-10-CM

## 2024-04-03 PROCEDURE — 1159F MED LIST DOCD IN RCRD: CPT | Performed by: INTERNAL MEDICINE

## 2024-04-03 PROCEDURE — 1160F RVW MEDS BY RX/DR IN RCRD: CPT | Performed by: INTERNAL MEDICINE

## 2024-04-03 PROCEDURE — G0439 PPPS, SUBSEQ VISIT: HCPCS | Performed by: INTERNAL MEDICINE

## 2024-04-03 PROCEDURE — 99214 OFFICE O/P EST MOD 30 MIN: CPT | Performed by: INTERNAL MEDICINE

## 2024-04-03 PROCEDURE — 1157F ADVNC CARE PLAN IN RCRD: CPT | Performed by: INTERNAL MEDICINE

## 2024-04-03 PROCEDURE — 1170F FXNL STATUS ASSESSED: CPT | Performed by: INTERNAL MEDICINE

## 2024-04-03 PROCEDURE — 1036F TOBACCO NON-USER: CPT | Performed by: INTERNAL MEDICINE

## 2024-04-03 RX ORDER — MONTELUKAST SODIUM 4 MG/1
1 TABLET, CHEWABLE ORAL
Qty: 60 TABLET | Refills: 3 | Status: SHIPPED | OUTPATIENT
Start: 2024-04-03

## 2024-04-03 RX ORDER — GABAPENTIN 100 MG/1
300 CAPSULE ORAL NIGHTLY
Qty: 270 CAPSULE | Refills: 1 | Status: SHIPPED | OUTPATIENT
Start: 2024-04-03 | End: 2024-05-13 | Stop reason: ALTCHOICE

## 2024-04-03 RX ORDER — ACETAMINOPHEN 500 MG
5000 TABLET ORAL DAILY
COMMUNITY

## 2024-04-03 ASSESSMENT — ENCOUNTER SYMPTOMS
DIZZINESS: 0
PALPITATIONS: 1
SHORTNESS OF BREATH: 0
FATIGUE: 0

## 2024-04-03 ASSESSMENT — PATIENT HEALTH QUESTIONNAIRE - PHQ9
1. LITTLE INTEREST OR PLEASURE IN DOING THINGS: NOT AT ALL
2. FEELING DOWN, DEPRESSED OR HOPELESS: NOT AT ALL
SUM OF ALL RESPONSES TO PHQ9 QUESTIONS 1 AND 2: 0

## 2024-04-03 ASSESSMENT — ACTIVITIES OF DAILY LIVING (ADL)
TAKING_MEDICATION: INDEPENDENT
GROCERY_SHOPPING: INDEPENDENT
MANAGING_FINANCES: INDEPENDENT
DOING_HOUSEWORK: INDEPENDENT
BATHING: INDEPENDENT
DRESSING: INDEPENDENT

## 2024-04-03 NOTE — PROGRESS NOTES
"Subjective   Reason for Visit: Nery Adan is an 68 y.o. female here for a Medicare Wellness visit and chronic medical problems.    Past Medical, Surgical, and Family History reviewed and updated in chart.    Reviewed all medications by prescribing practitioner or clinical pharmacist (such as prescriptions, OTCs, herbal therapies and supplements) and documented in the medical record.    Still has daily heart palps.  Cut coffee back to 1 cup daily.  Symptoms more frequent in afternoon and evening.  Taking requip 0.5 mg with relief of leg symptoms.  ? Side effects with med causing heart palps.  Discussed use of gabapentin for RLS.  Cardiac workup, meds and labs reviewed.      Patient Care Team:  Kevin Jean MD as PCP - General     Review of Systems   Constitutional:  Negative for fatigue.   Respiratory:  Negative for shortness of breath.    Cardiovascular:  Positive for palpitations. Negative for chest pain.   Musculoskeletal:         Leg pain.   Neurological:  Negative for dizziness.       Objective   Vitals:  /78 (BP Location: Left arm, Patient Position: Sitting)   Pulse 80   Ht 1.575 m (5' 2\")   Wt 51.3 kg (113 lb)   SpO2 100%   BMI 20.67 kg/m²       Physical Exam  Constitutional:       Appearance: Normal appearance.   Neurological:      General: No focal deficit present.      Mental Status: She is alert.   Psychiatric:         Mood and Affect: Mood normal.         Behavior: Behavior normal.         Thought Content: Thought content normal.         Judgment: Judgment normal.         Assessment/Plan   Problem List Items Addressed This Visit       Chronic pain syndrome    Current Assessment & Plan     Pain improved/rusty with gabapentin.         Relevant Medications    gabapentin (Neurontin) 100 mg capsule    RLS (restless legs syndrome)    Current Assessment & Plan     Stop requip, ? Side effects.  Increase gabapentin to 300 mg at 5 pm.  Ok to to slowly increase dose 100 mg at a time to " total dose of 600 mg.           Routine general medical examination at health care facility - Primary    Relevant Orders    1 Year Follow Up In Primary Care - Wellness Exam    SVT (supraventricular tachycardia) (CMS/HCC)    Current Assessment & Plan     SVT, HR at 170 for 13 beats.  Cardiac workup negative  ? Side effect requip.  Stop requip and increase dose of gabapentin to 300 mg.           Dumping syndrome    Current Assessment & Plan     Pt requested change to colestipol.         Relevant Medications    colestipol (Colestid) 1 gram tablet    Low vitamin D level    Current Assessment & Plan     Rec D3 5000 units daily.

## 2024-04-03 NOTE — ASSESSMENT & PLAN NOTE
Stop requip, ? Side effects.  Increase gabapentin to 300 mg at 5 pm.  Ok to to slowly increase dose 100 mg at a time to total dose of 600 mg.

## 2024-04-03 NOTE — ASSESSMENT & PLAN NOTE
SVT, HR at 170 for 13 beats.  Cardiac workup negative  ? Side effect requip.  Stop requip and increase dose of gabapentin to 300 mg.

## 2024-04-29 ENCOUNTER — APPOINTMENT (OUTPATIENT)
Dept: PRIMARY CARE | Facility: CLINIC | Age: 69
End: 2024-04-29
Payer: MEDICARE

## 2024-05-13 ENCOUNTER — OFFICE VISIT (OUTPATIENT)
Dept: PRIMARY CARE | Facility: CLINIC | Age: 69
End: 2024-05-13
Payer: MEDICARE

## 2024-05-13 VITALS
HEART RATE: 75 BPM | HEIGHT: 62 IN | SYSTOLIC BLOOD PRESSURE: 122 MMHG | DIASTOLIC BLOOD PRESSURE: 76 MMHG | WEIGHT: 111 LBS | OXYGEN SATURATION: 97 % | BODY MASS INDEX: 20.43 KG/M2

## 2024-05-13 DIAGNOSIS — G89.4 CHRONIC PAIN SYNDROME: ICD-10-CM

## 2024-05-13 DIAGNOSIS — G25.81 RLS (RESTLESS LEGS SYNDROME): Primary | ICD-10-CM

## 2024-05-13 DIAGNOSIS — R00.2 HEART PALPITATIONS: ICD-10-CM

## 2024-05-13 PROCEDURE — 1159F MED LIST DOCD IN RCRD: CPT | Performed by: INTERNAL MEDICINE

## 2024-05-13 PROCEDURE — 99213 OFFICE O/P EST LOW 20 MIN: CPT | Performed by: INTERNAL MEDICINE

## 2024-05-13 PROCEDURE — 1160F RVW MEDS BY RX/DR IN RCRD: CPT | Performed by: INTERNAL MEDICINE

## 2024-05-13 PROCEDURE — 1036F TOBACCO NON-USER: CPT | Performed by: INTERNAL MEDICINE

## 2024-05-13 PROCEDURE — 1157F ADVNC CARE PLAN IN RCRD: CPT | Performed by: INTERNAL MEDICINE

## 2024-05-13 RX ORDER — GABAPENTIN 100 MG/1
100 CAPSULE ORAL SEE ADMIN INSTRUCTIONS
Qty: 630 CAPSULE | Refills: 1 | Status: SHIPPED | OUTPATIENT
Start: 2024-05-13

## 2024-05-13 ASSESSMENT — ENCOUNTER SYMPTOMS
SHORTNESS OF BREATH: 0
FATIGUE: 0
PALPITATIONS: 0
DIZZINESS: 0

## 2024-05-13 NOTE — PROGRESS NOTES
"Subjective   Patient ID: Nery Adan is a 68 y.o. female who presents for Follow-up chronic medical problems.    Stopped requip, ? Side effects, heart palps.  Off requip now, no further problems with heart palps.  Usually in bed at 10pm, up at 4:30am.  Taking gabapentin 500 mg at 5:30pm.  RLS symptoms at 11-12pm.  Takes 200 mg additional gabapentin with relief.  Meds reviewed.     Review of Systems   Constitutional:  Negative for fatigue.   Respiratory:  Negative for shortness of breath.    Cardiovascular:  Negative for chest pain and palpitations.   Musculoskeletal:         Leg pain at 11-12 pm.   Neurological:  Negative for dizziness.       Objective   /76 (BP Location: Left arm, Patient Position: Sitting)   Pulse 75   Ht 1.575 m (5' 2\")   Wt 50.3 kg (111 lb)   SpO2 97%   BMI 20.30 kg/m²     Physical Exam  Constitutional:       Appearance: Normal appearance.   Neck:      Vascular: No carotid bruit.   Cardiovascular:      Rate and Rhythm: Normal rate and regular rhythm.      Pulses: Normal pulses.      Heart sounds: Normal heart sounds.   Pulmonary:      Effort: Pulmonary effort is normal.      Breath sounds: Normal breath sounds.   Neurological:      General: No focal deficit present.      Mental Status: She is alert.   Psychiatric:         Mood and Affect: Mood normal.         Behavior: Behavior normal.         Thought Content: Thought content normal.         Judgment: Judgment normal.       Assessment/Plan   Problem List Items Addressed This Visit             ICD-10-CM    Chronic pain syndrome G89.4     Condition stable with gabapentin.         Relevant Medications    gabapentin (Neurontin) 100 mg capsule    RLS (restless legs syndrome) - Primary G25.81     RLS improving with gabapentin 700 mg, split dose.  Rec 400 mg at 5;30pm, 300 mg at 9:30pm.         Relevant Medications    gabapentin (Neurontin) 100 mg capsule    Heart palpitations R00.2     No heart palps since requip discontinued.      "

## 2024-07-23 ENCOUNTER — APPOINTMENT (OUTPATIENT)
Dept: HEMATOLOGY/ONCOLOGY | Facility: CLINIC | Age: 69
End: 2024-07-23
Payer: MEDICARE

## 2024-07-23 ENCOUNTER — APPOINTMENT (OUTPATIENT)
Dept: RADIOLOGY | Facility: CLINIC | Age: 69
End: 2024-07-23
Payer: MEDICARE

## 2024-08-02 DIAGNOSIS — K91.1 DUMPING SYNDROME: ICD-10-CM

## 2024-08-02 RX ORDER — MONTELUKAST SODIUM 4 MG/1
1 TABLET, CHEWABLE ORAL
Qty: 180 TABLET | Refills: 3 | Status: SHIPPED | OUTPATIENT
Start: 2024-08-02

## 2024-08-05 ENCOUNTER — HOSPITAL ENCOUNTER (OUTPATIENT)
Dept: RADIOLOGY | Facility: HOSPITAL | Age: 69
Discharge: HOME | End: 2024-08-05
Payer: COMMERCIAL

## 2024-08-05 ENCOUNTER — OFFICE VISIT (OUTPATIENT)
Dept: HEMATOLOGY/ONCOLOGY | Facility: HOSPITAL | Age: 69
End: 2024-08-05
Payer: COMMERCIAL

## 2024-08-05 VITALS
DIASTOLIC BLOOD PRESSURE: 67 MMHG | HEIGHT: 62 IN | TEMPERATURE: 97 F | RESPIRATION RATE: 16 BRPM | WEIGHT: 113.32 LBS | SYSTOLIC BLOOD PRESSURE: 115 MMHG | BODY MASS INDEX: 20.85 KG/M2 | OXYGEN SATURATION: 96 % | HEART RATE: 95 BPM

## 2024-08-05 VITALS — HEIGHT: 62 IN | BODY MASS INDEX: 20.24 KG/M2 | WEIGHT: 110 LBS

## 2024-08-05 DIAGNOSIS — C50.919 MALIGNANT NEOPLASM OF UNSPECIFIED SITE OF UNSPECIFIED FEMALE BREAST (MULTI): ICD-10-CM

## 2024-08-05 DIAGNOSIS — Z08 ENCOUNTER FOR FOLLOW-UP SURVEILLANCE OF BREAST CANCER: ICD-10-CM

## 2024-08-05 DIAGNOSIS — C50.911 MALIGNANT NEOPLASM OF UNSPECIFIED SITE OF RIGHT FEMALE BREAST (MULTI): ICD-10-CM

## 2024-08-05 DIAGNOSIS — Z08 ENCOUNTER FOR FOLLOW-UP EXAMINATION AFTER COMPLETED TREATMENT FOR MALIGNANT NEOPLASM: ICD-10-CM

## 2024-08-05 DIAGNOSIS — Z85.3 HISTORY OF BREAST CANCER: Primary | ICD-10-CM

## 2024-08-05 DIAGNOSIS — Z85.3 ENCOUNTER FOR FOLLOW-UP SURVEILLANCE OF BREAST CANCER: ICD-10-CM

## 2024-08-05 DIAGNOSIS — Z12.31 ENCOUNTER FOR SCREENING MAMMOGRAM FOR MALIGNANT NEOPLASM OF BREAST: ICD-10-CM

## 2024-08-05 DIAGNOSIS — C50.911 INVASIVE DUCTAL CARCINOMA OF BREAST, RIGHT (MULTI): ICD-10-CM

## 2024-08-05 PROCEDURE — 99214 OFFICE O/P EST MOD 30 MIN: CPT | Performed by: NURSE PRACTITIONER

## 2024-08-05 PROCEDURE — 77063 BREAST TOMOSYNTHESIS BI: CPT | Mod: LEFT SIDE | Performed by: RADIOLOGY

## 2024-08-05 PROCEDURE — 1126F AMNT PAIN NOTED NONE PRSNT: CPT | Performed by: NURSE PRACTITIONER

## 2024-08-05 PROCEDURE — 77067 SCR MAMMO BI INCL CAD: CPT | Mod: LT

## 2024-08-05 PROCEDURE — 3008F BODY MASS INDEX DOCD: CPT | Performed by: NURSE PRACTITIONER

## 2024-08-05 PROCEDURE — 1160F RVW MEDS BY RX/DR IN RCRD: CPT | Performed by: NURSE PRACTITIONER

## 2024-08-05 PROCEDURE — 1157F ADVNC CARE PLAN IN RCRD: CPT | Performed by: NURSE PRACTITIONER

## 2024-08-05 PROCEDURE — 1036F TOBACCO NON-USER: CPT | Performed by: NURSE PRACTITIONER

## 2024-08-05 PROCEDURE — 1159F MED LIST DOCD IN RCRD: CPT | Performed by: NURSE PRACTITIONER

## 2024-08-05 PROCEDURE — 77067 SCR MAMMO BI INCL CAD: CPT | Mod: LEFT SIDE | Performed by: RADIOLOGY

## 2024-08-05 RX ORDER — ROPINIROLE 1 MG/1
1 TABLET, FILM COATED ORAL NIGHTLY
COMMUNITY

## 2024-08-05 ASSESSMENT — PAIN SCALES - GENERAL: PAINLEVEL: 0-NO PAIN

## 2024-08-05 NOTE — PROGRESS NOTES
Patient ID: Nery Adan is a 68 y.o. female.  BREAST CANCER DIAGNOSIS:        Breast         AJCC Edition: 8th (AJCC), Diagnosis Date: Apr 2018, IIIC, cT4d cN1 cM0 G3        Treatment History:    1. She developed right breast mass and redness that did not respond to antibiotics 4/2018.   2. Core biopsy right breast 4/20/18 showed invasive ductal  carcinoma grade 3 ER 0% TN 0% HER2 2+, not amplified by dual LINDSAY. Skin biopsy negative but staged [id number]. She had an enlarged right axillary  LN and indeterminate liver lesions, cleared with liver MRI.  3. She had preoperative chemotherapy with Adriamycin/Cytoxan followed by weekly Paclitaxel initiated 7/6/18 and completed 11/2/18. Treatment complicated  by PE 11/2018. IVC filter plaed before surgery.  4. Right mastectomy and ALN 12/13/18 showed no residual breast disease and 0/8 ALN, pCR.   5. Right chest wall and jose raul radiation completed from 1/21/19 - 3/7/19  6. No adjuvant capecitabine recommended with pCR. Observation planned.       Past Medical History: Nery has a past medical history of Abnormal weight loss (04/29/2020), Acute vaginitis (10/17/2019), Breast cancer (Multi) (01/01/2018), Contact with and (suspected) exposure to covid-19 (12/14/2020), COVID-19 (06/22/2022), Encounter for follow-up examination after completed treatment for conditions other than malignant neoplasm (02/25/2020), Follicular disorder, unspecified (04/20/2021), Headache, unspecified (12/14/2020), Left lower quadrant abdominal swelling, mass and lump (05/08/2020), Left lower quadrant pain (05/21/2020), Mastitis without abscess, Other conditions influencing health status (02/21/2022), Other specified disorders of nose and nasal sinuses (03/25/2021), Pain in right leg (10/25/2021), Pain in unspecified finger(s) (04/26/2021), Personal history of COVID-19, Personal history of other diseases of the respiratory system (04/20/2021), Personal history of other diseases of the respiratory  system (2020), Personal history of other drug therapy, Personal history of other infectious and parasitic diseases (2020), Personal history of other infectious and parasitic diseases (2018), Personal history of other mental and behavioral disorders (10/01/2019), Personal history of other specified conditions (2021), Personal history of other specified conditions (2019), Personal history of other specified conditions (2020), Postnasal drip (2020), Unspecified blepharitis left eye, unspecified eyelid (2022), Unspecified mononeuropathy of left lower limb, and Unspecified mononeuropathy of right lower limb.  Surgical History:  Nery has a past surgical history that includes Other surgical history (10/30/2019); Rotator cuff repair (05/15/2018);  section, classic (05/15/2018); Gallbladder surgery (05/15/2018); Gastric fundoplication (05/15/2018); Other surgical history (2019); Colonoscopy (2020); and Breast biopsy (Left, 1967).  Social History:  Nery reports that she has never smoked. She has never used smokeless tobacco. She reports current alcohol use. She reports that she does not use drugs.  Social Substance History:  ·  Smoking Status never smoker   ·  Additional History     , had 3 pregnancies and 2 children. Son  of heroin overdose. daughter is alive. No history of smoking, alcohol abuse or drug abuse. Retired .      GYN  Menarche at age 9, last menstrual period 50 was on oral contraceptives for 3 years and HRT for 2 years. First pregnancy at 19. Did not breast-feed her children.      Past medical history:     Neuropathy of left foot     Neuropathy of right foot.     Past surgical history:      ×2, in  and .     History of esophagogastric fundoplasty Niesen fundoplication in      Cholecystectomy in      Right rotator cuff repair in , left in     Family History:    Family  "History   Problem Relation Name Age of Onset    Diabetes Mother      COPD Mother      Diabetes Father      Other (BLADDER CANCER) Father      Heart attack Father      Diabetes Sister      Other (CARDIAC DISORDER) Sister      Diabetes Brother      Other (Other) Brother      Kidney cancer Maternal Grandmother      Lung cancer Maternal Grandfather      Hodgkin's lymphoma Other MATERNAL RELATIVES      Family Oncology History:  Cancer-related family history includes Kidney cancer in her maternal grandmother; Lung cancer in her maternal grandfather.    HISTORY OF PRESENT ILLNESS:  Nery Adan is a 68 y.o. female who presents today breast cancer surveillance follow up visit. She has no new breast cancer concerns. She has completed mammogram today.     She denies any chest pain or breathing issues. No cough or sob. Continues on treadmill 30 min daily.      She denies any vision changes or headache issues, dizziness or loss of balance, no falls    She denies any new or unexplained bone aches or pains    She denies any skin lesions or masses, oral sores lesions or infections    She reports a normal appetite and normal bowel movements, normal urination.     She has baseline issues with sleep but denies any fatigue. She reports good energy.       Review of Systems - Oncology  ROS 14 points performed, See HPI for exceptions   OBJECTIVE:  VS / Pain:  /67   Pulse 95   Temp 36.1 °C (97 °F) (Temporal)   Resp 16   Ht 1.575 m (5' 2.01\")   Wt 51.4 kg (113 lb 5.1 oz)   SpO2 96%   BMI 20.72 kg/m²   BSA: 1.5 meters squared   Pain Scale: 0  ECO- Fully active, able to carry on all pre-disease performance w/o restriction.          Physical Exam  Constitutional: Well developed, awake/alert/oriented x4, no distress, alert and cooperative  EYES: Sclera clear  ENMT: mucous membranes moist, no apparent injury, no lesions seen  Head/Neck: Neck supple, no apparent injury, thyroid without mass or tenderness, No JVD, trachea " midline, no bruits  Respiratory / Thoracic: Patent airways, clear to all lobes, normal breath sounds with good chest expansion, thorax symmetric.  Cardiovascular: Regular, rate and rhythm, no murmurs, 2+ equal pulses of the extremities, normal auscultated S 1and S 2  GI: Nondistended, soft, non-tender, no rebound tenderness or guarding, no masses palpable, no organomegaly, +BS, no bruits  Musculoskeletal: ROM intact, no joint swelling, normal strength, no spinal tenderness  Extremities: normal extremities, no cyanosis edema, contusions or wounds, no clubbing  Neurological: alert and oriented x4, intact senses, motor, response and reflexes, normal strength  Breast: Right mastectomy without reconstruction. Right chest wall and left breast free of any palpable masses or lesions.  Lymphatic: No cervical, supraclavicular, infraclavicular or axillary lymphadenopathy  Psychological: Appropriate and talkative mood and behavior  Skin: Warm and dry, no lesions, no rashes, no jaundice    Diagnostic Results     Narrative & Impression  Interpreted By:  Jodie Roland and Marshall Colin   STUDY:  BI MAMMO LEFT SCREENING TOMOSYNTHESIS;  8/5/2024 11:04 am      ACCESSION NUMBER(S):  [id number]      ORDERING CLINICIAN:  DIEGO MCGARRY      INDICATION:  Annual mammogram. History of right mastectomy with chemo and  radiation.      COMPARISON:  01/22/2024, 07/1923 and 06/29/2022.      FINDINGS:  2D and tomosynthesis images were reviewed at 1 mm slice thickness.      Density:  The breast tissue is heterogeneously dense, which may  obscure small masses.      No suspicious masses or calcifications are identified.      IMPRESSION:  No mammographic evidence of malignancy.      BI-RADS CATEGORY:  BI-RADS Category:  1 Negative.  Recommendation:  Annual Screening.  Recommended Date:  1 Year.  Laterality:  Left.      Echocardiogram Stress Test                        [facility]  [address], [address], [facility]  [address]                   Tel [phone]       Exercise Stress Echo    Patient Name:      CATHERINE SANCHEZ  Ordering Provider:     [id number] KAMALJIT OSCAR  Study Date:        3/26/2024            Reading Physician:     [id number] Lujan MD  MRN/PID:           [id number]             Supervising Physician: [id number] Lujan MD  Accession#:        [id number]         Fellow:  Date of Birth/Age: 1955 / 68 years Fellow:  Gender:            F                    Nurse:                 Jody Randolph RN  Admit Date:        3/26/2024            Technician:            Susie Mustafa  Admission Status:  Outpatient           Sonographer:           Rosibel Wu                                                                 CS  Height:            157.48 cm            Technologist:  Weight:            49.90 kg             Additional Staff:  BSA:               1.48 m2              Encounter#:            [id number]  BMI:               20.12 kg/m2          Patient Location:      [facility]                                                                 Lab    Study Type:    ECHOCARDIOGRAM STRESS TEST  Diagnosis/ICD: Other chest pain-R07.89; Shortness of breath-R06.02  Indication:    Chest Pain and shortness of breath on exertion  CPT Code:      Stress Echo-55603; Stress Test Interpretation-63692; Stress Test                 Supervision-[id number]    Falls Risk:     Study Details: Correct procedure and correct patient verified verbally.       Patient History: . 67 y/o female presents for evaluation due to chest tightness and shortness of breath on exertion. PMH: family hx heart disease.  Allergies: Nitrofurantoin.       Medications: The patient's prescribed medication is cholestyramine/aspartame, gabapentin, neomycin/polymyxin,  ropinirole HCl. The patient took medications as prescribed.     Patient Performance: The patient exercised to stage II on a Jose M protocol for 5 minutes and 31 seconds, achieving 7.00 METS. The peak heart rate achieved was 151 bpm, which was 101 % of the age predicted target heart rate of 149 bpm. The resting blood pressure was 132/80 mmHg with a heart rate of 89 bpm. The standing blood pressure was 114/76 mmHg with a heart rate of 98 bpm. The patient's functional capacity was average. The patient developed shortness of breath during the stress exam. The symptoms resolved with rest. The blood pressure response was normal. The test was terminated due to: fatigue. Patient has met the discharge criteria and is discharged to home.     Baseline ECG: Resting ECG showed normal sinus rhythm with nonspecific ST-T wave changes.     Stress ECG: Stress ECG showed sinus tachycardia, with no abnormal findings.     Stress Stage Data:  +---+------+-------+---------------------+  HR Sys BPDias BPComments               +---+------+-------+---------------------+  89 132   80                            +---+------+-------+---------------------+  98 114   76                            +---+------+-------+---------------------+                  no baseline sx         +---+------+-------+---------------------+  [id number]   80     denies cp, denies sob  +---+------+-------+---------------------+  [id number]   82     denies cp, slight sob  +---+------+-------+---------------------+       Recovery ECG: Recovery ECG showed normal sinus rhythm, with one PVC. The heart rate recovery was normal.     +------------+---+------+-------+-----------------------+              HR Sys BPDias BPComments                 +------------+---+------+-------+-----------------------+  Recovery I  [id number]   60     montserrat cp, sob resolved  +------------+---+------+-------+-----------------------+  Recovery II [id number]   76      denies sx                +------------+---+------+-------+-----------------------+  Recovery III96 116   74     denies sx                +------------+---+------+-------+-----------------------+       Baseline Echo: The resting baseline ejection fraction was estimated at 55 to 60%. There are no regional wall motion abnormalities at baseline.     Stress Echo: There are no stress induced regional wall motion abnormalities. The ejection fraction is approximately 70 to 75% at peak stress. The left ventricular internal cavity size at peak stress is decreased. At peak stress, the global LV systolic function is increased.     Summary:   1. Adequate level of stress achieved.   2. Average functional capacity; normal blood pressure resposnse.   3. The resting ejection fraction was estimated at 55 to 60% with a peak exercise ejection fraction estimated at 70 to 75%.   4. No clinical, echocardiographic or electrocardiographic evidence for ischemia at a maximal workload.    [id number] Gonsalo Lujan MD  Electronically signed on 3/27/2024 at 4:39:14 PM            ** Final **  Impression:     Essentially stable CT chest including post treatment changes in the  right chest and 3 mm indeterminate left lung nodule. No significant  change from 11/19/2019, as detailed above.      CT Chest without Contrast [Jun 20 2023  9:05AM]    === 02/16/24 ===    BI MR BREAST BILATERAL WITH CONTRAST FULL PROTOCOL    - Impression -  1. No MRI evidence of malignancy or suspicious skin enhancement in  the left breast to account for reported skin changes. Clinical  follow-up is recommended.  2. Benign postsurgical changes of right mastectomy with no evidence  of local recurrence.    BI-RADS CATEGORY:  BI-RADS Category:  2 Benign.  Recommendation:  Clinical Follow-up and Continued Annual Screening.  Recommended Date:  At the discretion of the ordering provider.  Laterality:  Left.    For any future breast imaging appointments, please call  [phone]  ([phone]).      MACRO:  None    Signed by: Omid Villalobos 2/16/2024 6:50 PM  Dictation workstation:   [id number]      No visits with results within 6 Week(s) from this visit.   Latest known visit with results is:   Lab on 04/01/2024   Component Date Value Ref Range Status    WBC 04/01/2024 4.3 (L)  4.4 - 11.3 x10*3/uL Final    nRBC 04/01/2024 0.0  0.0 - 0.0 /100 WBCs Final    RBC 04/01/2024 4.41  4.00 - 5.20 x10*6/uL Final    Hemoglobin 04/01/2024 13.4  12.0 - 16.0 g/dL Final    Hematocrit 04/01/2024 42.8  36.0 - 46.0 % Final    MCV 04/01/2024 97  80 - 100 fL Final    MCH 04/01/2024 30.4  26.0 - 34.0 pg Final    MCHC 04/01/2024 31.3 (L)  32.0 - 36.0 g/dL Final    RDW 04/01/2024 13.0  11.5 - 14.5 % Final    Platelets 04/01/2024 181  150 - 450 x10*3/uL Final    Neutrophils % 04/01/2024 59.0  40.0 - 80.0 % Final    Immature Granulocytes %, Automated 04/01/2024 0.2  0.0 - 0.9 % Final    Lymphocytes % 04/01/2024 28.2  13.0 - 44.0 % Final    Monocytes % 04/01/2024 9.6  2.0 - 10.0 % Final    Eosinophils % 04/01/2024 2.3  0.0 - 6.0 % Final    Basophils % 04/01/2024 0.7  0.0 - 2.0 % Final    Neutrophils Absolute 04/01/2024 2.51  1.20 - 7.70 x10*3/uL Final    Immature Granulocytes Absolute, Au* 04/01/2024 0.01  0.00 - 0.70 x10*3/uL Final    Lymphocytes Absolute 04/01/2024 1.20  1.20 - 4.80 x10*3/uL Final    Monocytes Absolute 04/01/2024 0.41  0.10 - 1.00 x10*3/uL Final    Eosinophils Absolute 04/01/2024 0.10  0.00 - 0.70 x10*3/uL Final    Basophils Absolute 04/01/2024 0.03  0.00 - 0.10 x10*3/uL Final    Glucose 04/01/2024 80  74 - 99 mg/dL Final    Sodium 04/01/2024 142  136 - 145 mmol/L Final    Potassium 04/01/2024 4.2  3.5 - 5.3 mmol/L Final    Chloride 04/01/2024 108 (H)  98 - 107 mmol/L Final    Bicarbonate 04/01/2024 25  21 - 32 mmol/L Final    Anion Gap 04/01/2024 13  10 - 20 mmol/L Final    Urea Nitrogen 04/01/2024 14  6 - 23 mg/dL Final    Creatinine 04/01/2024 0.57  0.50 - 1.05 mg/dL Final    eGFR  04/01/2024 >90  >60 mL/min/1.73m*2 Final    Calcium 04/01/2024 9.7  8.6 - 10.6 mg/dL Final    Albumin 04/01/2024 4.2  3.4 - 5.0 g/dL Final    Alkaline Phosphatase 04/01/2024 61  33 - 136 U/L Final    Total Protein 04/01/2024 6.3 (L)  6.4 - 8.2 g/dL Final    AST 04/01/2024 25  9 - 39 U/L Final    Bilirubin, Total 04/01/2024 0.4  0.0 - 1.2 mg/dL Final    ALT 04/01/2024 29  7 - 45 U/L Final    Cholesterol 04/01/2024 168  0 - 199 mg/dL Final    HDL-Cholesterol 04/01/2024 58.0  mg/dL Final    Cholesterol/HDL Ratio 04/01/2024 2.9   Final    LDL Calculated 04/01/2024 84  <=99 mg/dL Final    VLDL 04/01/2024 26  0 - 40 mg/dL Final    Triglycerides 04/01/2024 128  0 - 149 mg/dL Final    Non HDL Cholesterol 04/01/2024 110  0 - 149 mg/dL Final    Thyroid Stimulating Hormone 04/01/2024 1.99  0.44 - 3.98 mIU/L Final    Vitamin D, 25-Hydroxy, Total 04/01/2024 8 (L)  30 - 100 ng/mL Final    Color, Urine 04/01/2024 Colorless (N)  Light-Yellow, Yellow, Dark-Yellow Final    Appearance, Urine 04/01/2024 Clear  Clear Final    Specific Gravity, Urine 04/01/2024 1.002 (N)  1.005 - 1.035 Final    pH, Urine 04/01/2024 5.5  5.0, 5.5, 6.0, 6.5, 7.0, 7.5, 8.0 Final    Protein, Urine 04/01/2024 NEGATIVE  NEGATIVE, 10 (TRACE), 20 (TRACE) mg/dL Final    Glucose, Urine 04/01/2024 Normal  Normal mg/dL Final    Blood, Urine 04/01/2024 NEGATIVE  NEGATIVE Final    Ketones, Urine 04/01/2024 NEGATIVE  NEGATIVE mg/dL Final    Bilirubin, Urine 04/01/2024 NEGATIVE  NEGATIVE Final    Urobilinogen, Urine 04/01/2024 Normal  Normal mg/dL Final    Nitrite, Urine 04/01/2024 NEGATIVE  NEGATIVE Final    Leukocyte Esterase, Urine 04/01/2024 NEGATIVE  NEGATIVE Final        Assessment/Plan   History of breast cancer, Clinical: Stage IIIC (cT4d, cN1, cM0, G3, ER-, SC-, HER2-)  Diagnoses and all orders for this visit:  History of breast cancer (Primary)  Encounter for follow-up surveillance of breast cancer      Problem List Items Addressed This Visit       History of  breast cancer - Primary    Encounter for follow-up surveillance of breast cancer        IIIC, cT4d cN1 cM0   April 2018 with RIGHT TNBC highly suggestive of inflammatory breast cancer even though the skin biopsy was negative. A breast biopsy consistent with moderately differentiated infiltrating ductal. She is s/p chemotherapy and radiation therapy. She had  right mastectomy without reconstruction 1/2019 with pCR.  Observational therapy for TNBC. Has completed genetic counseling- negative for any mutations or variants.       There is no evidence today on clinical exam or left mammogram for breast cancer recurrence. Today we have discussed she has completed 6+ years of surveillance from 2018 RIGHT TNBC. Now can transition back to PCP for annual mammogram and clinical exam as standard of care is 5 year surveillance with triple negative breast cancer. She is agreeable to this plan.      Left Lung Nodule.  Called out on CT in 2019- see results. Updated CT June 2023 with stable results from 2019 initial imaging. No further imaging warranted without concerning symptoms.     General Health Maintenance   PCP DR. Jean annually and as needed        At least 25 minutes of direct consultation was spent with the patient today reviewing her cancer care plan, educating and answering questions regarding ongoing follow up, greater than 50% in counseling and coordination of care    Treatment Plan:  [No matching plan found]      Thank you for the opportunity to be involved in the care of Nery Adan.   We discussed the clinical significance of diagnosis, goals of care and treatment plan in detail.   Please do not hesitate to reach out with any questions. Thank you.     -------------------------------------------------------------------------------------------------------------------------------  Linda Wu MSN, APRN, FNP-C  [facility]  Division of Medical Oncology- Breast   Collaborating Physician   Kiel Palmer   [facility] Trina Florentino, Talita Farfan [place] [address]  Phone: [phone]  Fax: [phone]  Available via Secure Chat    Confidential Peer Review Document-  Privilege  Privileged Pursuant to [place] Revised Code Section 2305.24, .25, .251 & .252

## 2024-08-05 NOTE — PATIENT INSTRUCTIONS
You have completed 6+ years of surveillance from 2018 RIGHT TNBC. Now can transition back to PCP for annual mammogram and clinical exam as standard of care is 5 year surveillance with triple negative breast cancer. Mammogram is normal today. Next mammogram is due AFTER 8/5/2025- I would encourage you to continue to do this at a  breast center     Feb 2024 Breast MRI was normal. You can consider to continue with FAST Breast MRI per PCP moving forward which is $250 out of pocket program at      Updated CT of the chest for Left lung nodule has not changed since 2019. You only need CT for concerning new symptoms.     PCP DR. Jean annually and as needed     Call with any questions, concerns or treatment intolerance prior to next office visit [phone].

## 2024-08-12 ENCOUNTER — APPOINTMENT (OUTPATIENT)
Dept: PRIMARY CARE | Facility: CLINIC | Age: 69
End: 2024-08-12
Payer: COMMERCIAL

## 2024-08-12 VITALS
DIASTOLIC BLOOD PRESSURE: 72 MMHG | OXYGEN SATURATION: 96 % | SYSTOLIC BLOOD PRESSURE: 128 MMHG | BODY MASS INDEX: 21.35 KG/M2 | WEIGHT: 116 LBS | HEART RATE: 82 BPM | HEIGHT: 62 IN

## 2024-08-12 DIAGNOSIS — G89.4 CHRONIC PAIN SYNDROME: ICD-10-CM

## 2024-08-12 DIAGNOSIS — G25.81 RLS (RESTLESS LEGS SYNDROME): Primary | ICD-10-CM

## 2024-08-12 DIAGNOSIS — Z85.3 HISTORY OF BREAST CANCER: ICD-10-CM

## 2024-08-12 DIAGNOSIS — R79.89 LOW VITAMIN D LEVEL: ICD-10-CM

## 2024-08-12 DIAGNOSIS — Z12.39 ENCOUNTER FOR BREAST CANCER SCREENING USING NON-MAMMOGRAM MODALITY: ICD-10-CM

## 2024-08-12 PROBLEM — C50.911 INVASIVE DUCTAL CARCINOMA OF BREAST, RIGHT (MULTI): Status: RESOLVED | Noted: 2024-08-05 | Resolved: 2024-08-12

## 2024-08-12 PROCEDURE — 1160F RVW MEDS BY RX/DR IN RCRD: CPT | Performed by: INTERNAL MEDICINE

## 2024-08-12 PROCEDURE — 99214 OFFICE O/P EST MOD 30 MIN: CPT | Performed by: INTERNAL MEDICINE

## 2024-08-12 PROCEDURE — 1124F ACP DISCUSS-NO DSCNMKR DOCD: CPT | Performed by: INTERNAL MEDICINE

## 2024-08-12 PROCEDURE — 1157F ADVNC CARE PLAN IN RCRD: CPT | Performed by: INTERNAL MEDICINE

## 2024-08-12 PROCEDURE — 1036F TOBACCO NON-USER: CPT | Performed by: INTERNAL MEDICINE

## 2024-08-12 PROCEDURE — 3008F BODY MASS INDEX DOCD: CPT | Performed by: INTERNAL MEDICINE

## 2024-08-12 PROCEDURE — 1159F MED LIST DOCD IN RCRD: CPT | Performed by: INTERNAL MEDICINE

## 2024-08-12 RX ORDER — ROPINIROLE 1 MG/1
0.5 TABLET, FILM COATED ORAL NIGHTLY
Qty: 45 TABLET | Refills: 3 | Status: SHIPPED | OUTPATIENT
Start: 2024-08-12

## 2024-08-12 RX ORDER — CHOLECALCIFEROL (VITAMIN D3) 50 MCG
50 TABLET ORAL DAILY
COMMUNITY

## 2024-08-12 RX ORDER — GABAPENTIN 100 MG/1
100 CAPSULE ORAL SEE ADMIN INSTRUCTIONS
Qty: 630 CAPSULE | Refills: 1 | Status: SHIPPED | OUTPATIENT
Start: 2024-08-12

## 2024-08-12 ASSESSMENT — ENCOUNTER SYMPTOMS
SHORTNESS OF BREATH: 0
DIZZINESS: 0
ABDOMINAL PAIN: 0
FATIGUE: 0

## 2024-08-12 NOTE — PROGRESS NOTES
"Subjective   Patient ID: Nery Adan is a 68 y.o. female who presents for Follow-up chronic medical problems.    No heart palps off requip.  However RLS much worse despite gabapentin.  Now taking requip 0.5 mg and gabapentin 600-800 mg daily.  Still  has pain in left SI joint area.  Seems better with HEP, walking, stretching.  Recurrent sinus infections.  Has fu with ENT.  Ears cleaned out.  Has constant sensation in right ear.  Meds and labs reviewed.       Review of Systems   Constitutional:  Negative for fatigue.   Respiratory:  Negative for shortness of breath.    Cardiovascular:  Negative for chest pain.   Gastrointestinal:  Negative for abdominal pain.   Musculoskeletal:         Leg pain, left SI joint pain.   Neurological:  Negative for dizziness.       Objective   /72 (BP Location: Left arm, Patient Position: Sitting)   Pulse 82   Ht 1.575 m (5' 2\")   Wt 52.6 kg (116 lb)   SpO2 96%   BMI 21.22 kg/m²     Physical Exam  Constitutional:       Appearance: Normal appearance.   Neck:      Vascular: No carotid bruit.   Cardiovascular:      Rate and Rhythm: Normal rate and regular rhythm.      Pulses: Normal pulses.      Heart sounds: Normal heart sounds.   Pulmonary:      Effort: Pulmonary effort is normal.      Breath sounds: Normal breath sounds.   Neurological:      General: No focal deficit present.      Mental Status: She is alert.   Psychiatric:         Mood and Affect: Mood normal.         Behavior: Behavior normal.         Thought Content: Thought content normal.         Judgment: Judgment normal.         Assessment/Plan   Problem List Items Addressed This Visit             ICD-10-CM    Chronic pain syndrome G89.4     Condition rusty with gabapentin.           Relevant Medications    gabapentin (Neurontin) 100 mg capsule    History of breast cancer Z85.3    Relevant Orders    MR breast bilateral w IV contrast fast screening self pay    RLS (restless legs syndrome) - Primary G25.81     " Condition improved with low dose requip and gabapentin.         Relevant Medications    gabapentin (Neurontin) 100 mg capsule    Encounter for breast cancer screening using non-mammogram modality Z12.39    Relevant Orders    MR breast bilateral w IV contrast fast screening self pay    Low vitamin D level R79.89     Rec D3 2000 units daily.

## 2024-08-26 ENCOUNTER — OFFICE VISIT (OUTPATIENT)
Dept: OTOLARYNGOLOGY | Facility: CLINIC | Age: 69
End: 2024-08-26
Payer: MEDICARE

## 2024-08-26 VITALS
TEMPERATURE: 98.7 F | BODY MASS INDEX: 21.16 KG/M2 | HEIGHT: 62 IN | WEIGHT: 115 LBS | DIASTOLIC BLOOD PRESSURE: 73 MMHG | HEART RATE: 81 BPM | SYSTOLIC BLOOD PRESSURE: 105 MMHG

## 2024-08-26 DIAGNOSIS — H93.8X1 PRESSURE SENSATION IN RIGHT EAR: ICD-10-CM

## 2024-08-26 DIAGNOSIS — J34.89 NASAL DRAINAGE: Primary | ICD-10-CM

## 2024-08-26 PROCEDURE — 99203 OFFICE O/P NEW LOW 30 MIN: CPT | Performed by: NURSE PRACTITIONER

## 2024-08-26 PROCEDURE — 1157F ADVNC CARE PLAN IN RCRD: CPT | Performed by: NURSE PRACTITIONER

## 2024-08-26 PROCEDURE — 1126F AMNT PAIN NOTED NONE PRSNT: CPT | Performed by: NURSE PRACTITIONER

## 2024-08-26 PROCEDURE — 3008F BODY MASS INDEX DOCD: CPT | Performed by: NURSE PRACTITIONER

## 2024-08-26 PROCEDURE — 99213 OFFICE O/P EST LOW 20 MIN: CPT | Performed by: NURSE PRACTITIONER

## 2024-08-26 PROCEDURE — 1159F MED LIST DOCD IN RCRD: CPT | Performed by: NURSE PRACTITIONER

## 2024-08-26 SDOH — ECONOMIC STABILITY: FOOD INSECURITY: WITHIN THE PAST 12 MONTHS, YOU WORRIED THAT YOUR FOOD WOULD RUN OUT BEFORE YOU GOT MONEY TO BUY MORE.: NEVER TRUE

## 2024-08-26 SDOH — ECONOMIC STABILITY: FOOD INSECURITY: WITHIN THE PAST 12 MONTHS, THE FOOD YOU BOUGHT JUST DIDN'T LAST AND YOU DIDN'T HAVE MONEY TO GET MORE.: NEVER TRUE

## 2024-08-26 ASSESSMENT — LIFESTYLE VARIABLES
HOW OFTEN DO YOU HAVE A DRINK CONTAINING ALCOHOL: NEVER
SKIP TO QUESTIONS 9-10: 1
HOW MANY STANDARD DRINKS CONTAINING ALCOHOL DO YOU HAVE ON A TYPICAL DAY: PATIENT DOES NOT DRINK
AUDIT-C TOTAL SCORE: 0
HOW OFTEN DO YOU HAVE SIX OR MORE DRINKS ON ONE OCCASION: NEVER

## 2024-08-26 ASSESSMENT — PAIN SCALES - GENERAL: PAINLEVEL: 0-NO PAIN

## 2024-08-26 ASSESSMENT — COLUMBIA-SUICIDE SEVERITY RATING SCALE - C-SSRS
6. HAVE YOU EVER DONE ANYTHING, STARTED TO DO ANYTHING, OR PREPARED TO DO ANYTHING TO END YOUR LIFE?: NO
1. IN THE PAST MONTH, HAVE YOU WISHED YOU WERE DEAD OR WISHED YOU COULD GO TO SLEEP AND NOT WAKE UP?: NO
2. HAVE YOU ACTUALLY HAD ANY THOUGHTS OF KILLING YOURSELF?: NO

## 2024-08-26 ASSESSMENT — ENCOUNTER SYMPTOMS
OCCASIONAL FEELINGS OF UNSTEADINESS: 0
DEPRESSION: 0
LOSS OF SENSATION IN FEET: 0

## 2024-08-26 ASSESSMENT — PATIENT HEALTH QUESTIONNAIRE - PHQ9
2. FEELING DOWN, DEPRESSED OR HOPELESS: NOT AT ALL
SUM OF ALL RESPONSES TO PHQ9 QUESTIONS 1 AND 2: 0
1. LITTLE INTEREST OR PLEASURE IN DOING THINGS: NOT AT ALL

## 2024-08-26 NOTE — PROGRESS NOTES
Subjective   Patient ID: Nery Adan is a 68 y.o. female who presents for frequent sinus infections (Right ear pressure and possible fluid.).    HPI  She kept going to Urgent Care 3 times per year for repeated sinus infections.   Denies nasal congsetions. She gets nasal drainage, has to clear her throat. Denies facial pain or pressure. Denies lack of smell.   She broke her nose> 30 years ago and has rhinoplasty.   No nasal sprays.    She has ear pressure all of the time. When she tilts her head it feels like there is liquid running out. Just the right ear. No trouble hearing. No ear pain. Denies tinnitus. Denies vertigo. Denies previous ear surgery.     Patient Active Problem List   Diagnosis    Agatston CAC score, <100    Atrophy of vagina    Breast erythema    Chronic left SI joint pain    Chronic pain syndrome    Chronic rhinitis    Chronic UTI    Degeneration of intervertebral disc of cervical region    Degeneration of intervertebral disc of lumbar region    Gastric motility disorder    Hypercholesterolemia    Insomnia    History of breast cancer    Inversion, nipple    Buttock pain    Left hip pain    Leg length discrepancy    RLS (restless legs syndrome)    Low back pain    Mass of leg, right    Myofascial pain    Osteopenia    Peripheral neuropathic pain    Small fiber neuropathy    Soft tissue mass    Uterine polyp    Varicose veins of both lower extremities with pain    Venous insufficiency    Lung nodule seen on imaging study    Blepharitis of both upper and lower eyelid of left eye    Age-related cataract of both eyes    Encounter for follow-up surveillance of breast cancer    Extremely dense tissue of left breast on mammography    Cyst of skin of breast, left    Chest tightness    SOB (shortness of breath) on exertion    Heart palpitations    History of pulmonary embolus (PE)    Encounter for breast cancer screening using non-mammogram modality    SVT (supraventricular tachycardia) (CMS-[facility])     Dumping syndrome    Low vitamin D level     Past Surgical History:   Procedure Laterality Date    BREAST BIOPSY Left 1967    lt cyst removed    BREAST CYST EXCISION  1965     SECTION, CLASSIC  05/15/2018     Section    COLONOSCOPY  2020    NORMAL F/U 5YEARS    GALLBLADDER SURGERY  05/15/2018    Gallbladder Surgery    GASTRIC FUNDOPLICATION  05/15/2018    Esophagogastric Fundoplasty Nissen Fundoplication    MASTECTOMY  2018    OTHER SURGICAL HISTORY  10/30/2019    Right mastectomy    OTHER SURGICAL HISTORY  2019    Esophagogastroduodenoscopy    ROTATOR CUFF REPAIR  05/15/2018    Rotator Cuff Repair     Review of Systems    All other systems have been reviewed and are negative for complaints except for those mentioned in history of present illness, past medical history and problem list.    Objective   Physical Exam    Constitutional: No fever, chills, weight loss or weight gain  General appearance: Appears well, well-nourished, well groomed. No acute distress.    Communication: Normal communication    Psychiatric: Oriented to person, place and time. Normal mood and affect.    Neurologic: Cranial nerves II-XII grossly intact and symmetric bilaterally.    Head and Face:  Head: Atraumatic with no masses, lesions or scarring.  Face: Normal symmetry. No scars or deformities.  TMJ: Normal, no trismus.    Eyes: Conjunctiva not edematous or erythematous.     Right Ear: External inspection of ear with no deformity, scars, or masses. EAC is clear.  TM is intact with no sign of infection, effusion, or retraction.  No perforation seen.     Left Ear: External inspection of ear with no deformity, scars, or masses. EAC is clear.  TM is intact with no sign of infection, effusion, or retraction.  No perforation seen.     Pathak midline.  AC > BC bilaterally.    Nose: External inspection of nose: No nasal lesions, lacerations or scars. Anterior rhinoscopy with limited visualization past the inferior  turbinates. No tenderness on frontal or maxillary sinus palpation.    Oral Cavity/Mouth: Oral cavity and oropharynx mucosa moist and pink. No lesions or masses. Dentition normal. Tonsils appear normal. Uvula is midline. Tongue with no masses or lesions. Tongue with good mobility. The oropharynx is clear.    Neck: Normal appearing, symmetric, trachea midline.     Cardiovascular: Examination of peripheral vascular system shows no clubbing or cyanosis.    Respiratory: No respiratory distress increased work of breathing. Inspection of the chest with symmetric chest expansion and normal respiratory effort.    Skin: No head and neck rashes.    Lymph nodes: No adenopathy.     Assessment/Plan   Diagnoses and all orders for this visit:  Nasal drainage  Pressure sensation in right ear  Reassurance given.  I recommended Flonase 2 sprays each nostril once a day. Patient was given instruction on proper application of the medication by spraying intranasally and aiming towards the outer corners of the eyes. Patient was instructed to avoid the septum due to the risk of nasal bleeding.    Follow up in 4-6 weeks with an audiogram prior.  May perform nasopharyngoscopy at follow up to assess eustachain tubes if not improved.    All questions answered to patient satisfaction.        CLARE Beverly-CNP 08/26/24 9:09 AM

## 2024-10-03 ENCOUNTER — CLINICAL SUPPORT (OUTPATIENT)
Dept: AUDIOLOGY | Facility: CLINIC | Age: 69
End: 2024-10-03
Payer: MEDICARE

## 2024-10-03 ENCOUNTER — OFFICE VISIT (OUTPATIENT)
Dept: OTOLARYNGOLOGY | Facility: CLINIC | Age: 69
End: 2024-10-03
Payer: MEDICARE

## 2024-10-03 VITALS
TEMPERATURE: 98.7 F | HEIGHT: 62 IN | DIASTOLIC BLOOD PRESSURE: 70 MMHG | HEART RATE: 78 BPM | WEIGHT: 112 LBS | BODY MASS INDEX: 20.61 KG/M2 | SYSTOLIC BLOOD PRESSURE: 112 MMHG

## 2024-10-03 DIAGNOSIS — H93.8X1 EAR FULLNESS, RIGHT: Primary | ICD-10-CM

## 2024-10-03 DIAGNOSIS — J34.89 NASAL DRAINAGE: ICD-10-CM

## 2024-10-03 DIAGNOSIS — H93.8X1 PRESSURE SENSATION IN RIGHT EAR: Primary | ICD-10-CM

## 2024-10-03 PROCEDURE — 92550 TYMPANOMETRY & REFLEX THRESH: CPT | Performed by: AUDIOLOGIST

## 2024-10-03 PROCEDURE — 92511 NASOPHARYNGOSCOPY: CPT | Performed by: NURSE PRACTITIONER

## 2024-10-03 PROCEDURE — 99213 OFFICE O/P EST LOW 20 MIN: CPT | Performed by: NURSE PRACTITIONER

## 2024-10-03 PROCEDURE — 92557 COMPREHENSIVE HEARING TEST: CPT | Performed by: AUDIOLOGIST

## 2024-10-03 ASSESSMENT — COLUMBIA-SUICIDE SEVERITY RATING SCALE - C-SSRS
2. HAVE YOU ACTUALLY HAD ANY THOUGHTS OF KILLING YOURSELF?: NO
6. HAVE YOU EVER DONE ANYTHING, STARTED TO DO ANYTHING, OR PREPARED TO DO ANYTHING TO END YOUR LIFE?: NO
1. IN THE PAST MONTH, HAVE YOU WISHED YOU WERE DEAD OR WISHED YOU COULD GO TO SLEEP AND NOT WAKE UP?: NO

## 2024-10-03 ASSESSMENT — PATIENT HEALTH QUESTIONNAIRE - PHQ9
1. LITTLE INTEREST OR PLEASURE IN DOING THINGS: NOT AT ALL
SUM OF ALL RESPONSES TO PHQ9 QUESTIONS 1 AND 2: 0
2. FEELING DOWN, DEPRESSED OR HOPELESS: NOT AT ALL

## 2024-10-03 ASSESSMENT — ENCOUNTER SYMPTOMS
DEPRESSION: 0
LOSS OF SENSATION IN FEET: 0
OCCASIONAL FEELINGS OF UNSTEADINESS: 0

## 2024-10-03 ASSESSMENT — PAIN SCALES - GENERAL: PAINLEVEL: 0-NO PAIN

## 2024-10-03 NOTE — PROGRESS NOTES
"AUDIOLOGY ADULT AUDIOMETRIC EVALUATION      Name:  Nery Adan  :  1955  Age:  69 y.o.  Date of Evaluation:  10/3/2024    HISTORY  Reason for visit:  eustachian tube dysfunction    Ms. Adan is seen 10/3/2024 at the request of Tila Rodriguez CNP  for an evaluation of hearing.      Chief complaint:    - right-side eustachian tube dysfunction; symptoms include clicking, fullness, ear pressure, \"crunch crunch crunch\" when she talks;     Hearing loss:  no concerns; symmetric per patient   Tinnitus:   crunching; clicking  Otitis Media: denies   Otologic surgical history:  denies  Dizziness/imbalance:  denies  Otalgia:  denies   Ear pressure/fullness:  right ear pressure  History of excessive noise exposure:  yes, firearm noise (usually with hearing protection)  Other: none           EVALUATION  Please find audiogram in \"Media\" tab (Document Type:  Audiology Report) or included at the bottom of this note.    RESULTS   Otoscopic Evaluation: minimal cerumen bilaterally      Immittance Measures (226 Hz probe tone):   Tympanometry is consistent with normal middle ear pressure and normal tympanic membrane mobility bilaterally.       Ipsilateral acoustic reflexes (500-4000 Hz) are present for 500-2000 Hz for the right ear (absent 4000 Hz) and for 500-1000 Hz for the left ear (absent 6095-3951 Hz).      Test technique:  standard behavioral technique via [facility] earphones.  Reliability is good.    Pure Tone Audiometry:  Hearing sensitivity is in the normal hearing to moderately-severe hearing loss range bilaterally.       Speech Audiometry:        Right Ear:  Speech Reception Threshold (SRT) was obtained at 20 dBHL                 Speech discrimination score was 100% in quiet when words were presented at 70 dBHL (10 item NU-6 ordered-by-difficulty list).        Left Ear:  Speech Reception Threshold (SRT) was obtained at 20 dBHL                 Speech discrimination score was 100% in quiet when words were " presented at 70 dBHL (10 item NU-6 ordered-by-difficulty list).      IMPRESSIONS:  Patient is expected to have communication difficulty in adverse listening environments.    Patient is expected to benefit from effective communication strategies and may benefit from devices that provide amplification and/or improve the desired sound signal over that of background noise.       RECOMMENDATIONS  Continue with medical follow-up with Tila Rodriguez CNP.  Patient may consider a Hearing Aid Evaluation with an audiologist to discuss hearing assistive technology and services.    Reassess hearing in 1 year (or sooner if medically indicated or if there is a concern for a change in hearing).    Continue with medical follow-up as indicated.       PATIENT EDUCATION  Discussed results and recommendations with patient.  Questions were addressed and the patient was encouraged to contact our department should concerns arise.       PARAM Palumbo, [facility]  Licensed Audiologist

## 2024-10-03 NOTE — PROGRESS NOTES
Subjective   Patient ID: Nery Adan is a 69 y.o. female who presents for Follow-up.    HPI  INITIAL VISIT 8/26/2024:  She kept going to Urgent Care 3 times per year for repeated sinus infections.   Denies nasal congsetions. She gets nasal drainage, has to clear her throat. Denies facial pain or pressure. Denies lack of smell.   She broke her nose> 30 years ago and has rhinoplasty.   No nasal sprays.    She has ear pressure all of the time. When she tilts her head it feels like there is liquid running out. Just the right ear. No trouble hearing. No ear pain. Denies tinnitus. Denies vertigo. Denies previous ear surgery.     10/3/2024: Patient following up for her right ear.  Nothing has changed. She has been using Flonase.  She denies any pain.  She describes this more as a clicking/popping sensation versus tinnitus.    Patient Active Problem List   Diagnosis    Agatston CAC score, <100    Atrophy of vagina    Breast erythema    Chronic left SI joint pain    Chronic pain syndrome    Chronic rhinitis    Chronic UTI    Degeneration of intervertebral disc of cervical region    Degeneration of intervertebral disc of lumbar region    Gastric motility disorder    Hypercholesterolemia    Insomnia    History of breast cancer    Inversion, nipple    Buttock pain    Left hip pain    Leg length discrepancy    RLS (restless legs syndrome)    Low back pain    Mass of leg, right    Myofascial pain    Osteopenia    Peripheral neuropathic pain    Small fiber neuropathy    Soft tissue mass    Uterine polyp    Varicose veins of both lower extremities with pain    Venous insufficiency    Lung nodule seen on imaging study    Blepharitis of both upper and lower eyelid of left eye    Age-related cataract of both eyes    Encounter for follow-up surveillance of breast cancer    Extremely dense tissue of left breast on mammography    Cyst of skin of breast, left    Chest tightness    SOB (shortness of breath) on exertion    Heart  palpitations    History of pulmonary embolus (PE)    Encounter for breast cancer screening using non-mammogram modality    SVT (supraventricular tachycardia) (CMS-HCC)    Dumping syndrome    Low vitamin D level     Past Surgical History:   Procedure Laterality Date    BREAST BIOPSY Left 1967    lt cyst removed    BREAST CYST EXCISION  1965     SECTION, CLASSIC  05/15/2018     Section    COLONOSCOPY  2020    NORMAL F/U 5YEARS    GALLBLADDER SURGERY  05/15/2018    Gallbladder Surgery    GASTRIC FUNDOPLICATION  05/15/2018    Esophagogastric Fundoplasty Nissen Fundoplication    MASTECTOMY  2018    OTHER SURGICAL HISTORY  10/30/2019    Right mastectomy    OTHER SURGICAL HISTORY  2019    Esophagogastroduodenoscopy    ROTATOR CUFF REPAIR  05/15/2018    Rotator Cuff Repair     Review of Systems    All other systems have been reviewed and are negative for complaints except for those mentioned in history of present illness, past medical history and problem list.    Objective   Physical Exam    Constitutional: No fever, chills, weight loss or weight gain  General appearance: Appears well, well-nourished, well groomed. No acute distress.    Communication: Normal communication    Psychiatric: Oriented to person, place and time. Normal mood and affect.    Neurologic: Cranial nerves II-XII grossly intact and symmetric bilaterally.    Head and Face:  Head: Atraumatic with no masses, lesions or scarring.  Face: Normal symmetry. No scars or deformities.  TMJ: Normal, no trismus.    Eyes: Conjunctiva not edematous or erythematous.     Right Ear: External inspection of ear with no deformity, scars, or masses. EAC is clear.  TM is intact with no sign of infection, effusion, or retraction.  No perforation seen.     Left Ear: External inspection of ear with no deformity, scars, or masses. EAC is clear.  TM is intact with no sign of infection, effusion, or retraction.  No perforation seen.     Oral  Cavity/Mouth: Oral cavity and oropharynx mucosa moist and pink. No lesions or masses. Dentition normal. Tonsils appear normal. Uvula is midline. Tongue with no masses or lesions. Tongue with good mobility. The oropharynx is clear.    Neck: Normal appearing, symmetric, trachea midline.     Cardiovascular: Examination of peripheral vascular system shows no clubbing or cyanosis.    Respiratory: No respiratory distress increased work of breathing. Inspection of the chest with symmetric chest expansion and normal respiratory effort.    Skin: No head and neck rashes.    Lymph nodes: No adenopathy.     Procedure: Nasopharyngoscopy flexible  Indication: To visualize the eustachian tubes  Risks, benefits, alternatives, and expectations discussed with patient and she wishes to proceed.    FINDINGS:  A mixture of 4% topical Lidocaine and Oxymetazoline was administered into the nostrils.  Eustachian tube orifices were patent.  No mass or lesion noted.      Assessment/Plan   Diagnoses and all orders for this visit:  Pressure sensation in right ear  Reassurance given.  No mass or lesion noted on nasopharyngoscopy.  We discussed that her symptoms could be secondary to palatal myoclonus.  We discussed trialing a muscle relaxer but patient does not wish to pursue this.  We also briefly discussed patulous eustachian tube dysfunction but her symptoms really do not wind up with this.  We also discussed CT IAC to further evaluate the middle ear space with patient does not wish to pursue imaging at this time.  She will follow-up as needed.  If this becomes very bothersome she will call the office and we will try muscle relaxer.  All questions answered to patient's satisfaction.    All questions answered to patient satisfaction.     This note was created using speech recognition transcription software. Despite proofreading, several typographical errors might be present that might affect the meaning of the content. Please call with any  questions.         Tila Rodriguez, CLARE-CNP 10/03/24 4:17 PM

## 2024-11-19 ENCOUNTER — TELEPHONE (OUTPATIENT)
Dept: PRIMARY CARE | Facility: CLINIC | Age: 69
End: 2024-11-19
Payer: MEDICARE

## 2024-11-19 NOTE — TELEPHONE ENCOUNTER
Pt called, she states she is completely exhausted, states she felt the same way 6 yrs ago when she had cancer, patient would like labs and be advised

## 2024-11-20 ENCOUNTER — LAB (OUTPATIENT)
Dept: LAB | Facility: LAB | Age: 69
End: 2024-11-20
Payer: MEDICARE

## 2024-11-20 ENCOUNTER — OFFICE VISIT (OUTPATIENT)
Dept: PRIMARY CARE | Facility: CLINIC | Age: 69
End: 2024-11-20
Payer: MEDICARE

## 2024-11-20 VITALS
WEIGHT: 111 LBS | HEIGHT: 62 IN | DIASTOLIC BLOOD PRESSURE: 74 MMHG | OXYGEN SATURATION: 96 % | HEART RATE: 82 BPM | BODY MASS INDEX: 20.43 KG/M2 | SYSTOLIC BLOOD PRESSURE: 112 MMHG

## 2024-11-20 DIAGNOSIS — R53.83 FATIGUE, UNSPECIFIED TYPE: Primary | ICD-10-CM

## 2024-11-20 DIAGNOSIS — E55.9 VITAMIN D DEFICIENCY: ICD-10-CM

## 2024-11-20 DIAGNOSIS — H01.006 BLEPHARITIS OF EYELID OF LEFT EYE, UNSPECIFIED EYELID, UNSPECIFIED TYPE: ICD-10-CM

## 2024-11-20 DIAGNOSIS — R53.83 FATIGUE, UNSPECIFIED TYPE: ICD-10-CM

## 2024-11-20 PROCEDURE — 80053 COMPREHEN METABOLIC PANEL: CPT

## 2024-11-20 PROCEDURE — 1160F RVW MEDS BY RX/DR IN RCRD: CPT | Performed by: INTERNAL MEDICINE

## 2024-11-20 PROCEDURE — 85025 COMPLETE CBC W/AUTO DIFF WBC: CPT

## 2024-11-20 PROCEDURE — 82306 VITAMIN D 25 HYDROXY: CPT

## 2024-11-20 PROCEDURE — 36415 COLL VENOUS BLD VENIPUNCTURE: CPT

## 2024-11-20 PROCEDURE — 82607 VITAMIN B-12: CPT

## 2024-11-20 PROCEDURE — 99214 OFFICE O/P EST MOD 30 MIN: CPT | Performed by: INTERNAL MEDICINE

## 2024-11-20 PROCEDURE — 84443 ASSAY THYROID STIM HORMONE: CPT

## 2024-11-20 PROCEDURE — 1157F ADVNC CARE PLAN IN RCRD: CPT | Performed by: INTERNAL MEDICINE

## 2024-11-20 PROCEDURE — 3008F BODY MASS INDEX DOCD: CPT | Performed by: INTERNAL MEDICINE

## 2024-11-20 PROCEDURE — 1159F MED LIST DOCD IN RCRD: CPT | Performed by: INTERNAL MEDICINE

## 2024-11-20 RX ORDER — NEOMYCIN SULFATE, POLYMYXIN B SULFATE AND DEXAMETHASONE 3.5; 10000; 1 MG/ML; [USP'U]/ML; MG/ML
1 SUSPENSION/ DROPS OPHTHALMIC 4 TIMES DAILY
Qty: 5 ML | Refills: 3 | Status: SHIPPED | OUTPATIENT
Start: 2024-11-20

## 2024-11-20 ASSESSMENT — ENCOUNTER SYMPTOMS
SHORTNESS OF BREATH: 0
DIZZINESS: 0
ABDOMINAL PAIN: 0
FATIGUE: 1

## 2024-11-20 NOTE — PROGRESS NOTES
"Subjective   Patient ID: Nery Adan is a 69 y.o. female who presents for OTHER (Feels exhausted after working a part time job for 1 month) and chronic medical problems.    Working retail, PT x 5 hrs, 3 days a week.  Resigned from job after 1 month.  Walking 3/4 mile when working.  Saw podiatry 2-3 wks ago.  Opined calf tendinitis, plantar fasciitis, metatarsalgia.  Now c/o fatigue and daytime lethargy.  Worried about reoccurrence of breast cancer.  Mammogram and US 8-2024 negative.  MRI breast 2-2024 negative.  Sleeping 8-10 hours at night.  Difficult to get out of bed in am.  No snoring or headaches.  Not taking naps.  Reviewed stress test 3-2024, negative.  Still not feeling well after 3 weeks of quitting PT job.  Records, labs and meds reviewed.       Review of Systems   Constitutional:  Positive for fatigue.   Respiratory:  Negative for shortness of breath.    Cardiovascular:  Negative for chest pain.   Gastrointestinal:  Negative for abdominal pain.   Musculoskeletal:         Pain in feet.   Neurological:  Negative for dizziness.       Objective   /74 (Patient Position: Sitting)   Pulse 82   Ht 1.575 m (5' 2\")   Wt 50.3 kg (111 lb)   SpO2 96%   BMI 20.30 kg/m²     Physical Exam  Constitutional:       Appearance: Normal appearance.   Neck:      Vascular: No carotid bruit.   Cardiovascular:      Rate and Rhythm: Normal rate and regular rhythm.      Pulses: Normal pulses.      Heart sounds: Normal heart sounds.   Pulmonary:      Effort: Pulmonary effort is normal.      Breath sounds: Normal breath sounds.   Abdominal:      General: Abdomen is flat. Bowel sounds are normal.      Palpations: Abdomen is soft.   Lymphadenopathy:      Cervical: No cervical adenopathy.   Neurological:      General: No focal deficit present.      Mental Status: She is alert.   Psychiatric:         Mood and Affect: Mood normal.         Behavior: Behavior normal.         Thought Content: Thought content normal.         " Judgment: Judgment normal.   Negative lymph nodes bilateral axillary and groin areas.    Assessment/Plan     Rec workup for fatigue, CBC, CMP, TSH, vitamin D and B12.  Reviewed MRI breast, mammogram and US, stress test, all negative.  Fu with podiatry as directed.  Consider PT if no better.  Stop using lidocaine.  Keep next ov.    Problem List Items Addressed This Visit             ICD-10-CM    Low vitamin D level R79.89    Fatigue - Primary R53.83    Relevant Orders    Vitamin D 25-Hydroxy,Total (for eval of Vitamin D levels)    Vitamin B12    TSH with reflex to Free T4 if abnormal    CBC and Auto Differential    Comprehensive metabolic panel    Blepharitis of eyelid of left eye H01.006    Relevant Medications    neomycin-polymyxin-dexAMETHasone (Maxitrol) 3.5mg/mL-10,000 unit/mL-0.1 % ophthalmic suspension

## 2024-11-21 LAB
25(OH)D3 SERPL-MCNC: 59 NG/ML (ref 30–100)
ALBUMIN SERPL BCP-MCNC: 4.7 G/DL (ref 3.4–5)
ALP SERPL-CCNC: 78 U/L (ref 33–136)
ALT SERPL W P-5'-P-CCNC: 61 U/L (ref 7–45)
ANION GAP SERPL CALC-SCNC: 17 MMOL/L (ref 10–20)
AST SERPL W P-5'-P-CCNC: 28 U/L (ref 9–39)
BASOPHILS # BLD AUTO: 0.05 X10*3/UL (ref 0–0.1)
BASOPHILS NFR BLD AUTO: 0.8 %
BILIRUB SERPL-MCNC: 0.6 MG/DL (ref 0–1.2)
BUN SERPL-MCNC: 22 MG/DL (ref 6–23)
CALCIUM SERPL-MCNC: 9.4 MG/DL (ref 8.6–10.6)
CHLORIDE SERPL-SCNC: 105 MMOL/L (ref 98–107)
CO2 SERPL-SCNC: 22 MMOL/L (ref 21–32)
CREAT SERPL-MCNC: 0.57 MG/DL (ref 0.5–1.05)
EGFRCR SERPLBLD CKD-EPI 2021: >90 ML/MIN/1.73M*2
EOSINOPHIL # BLD AUTO: 0.12 X10*3/UL (ref 0–0.4)
EOSINOPHIL NFR BLD AUTO: 1.8 %
ERYTHROCYTE [DISTWIDTH] IN BLOOD BY AUTOMATED COUNT: 13.2 % (ref 11.5–14.5)
GLUCOSE SERPL-MCNC: 71 MG/DL (ref 74–99)
HCT VFR BLD AUTO: 47.7 % (ref 36–46)
HGB BLD-MCNC: 15 G/DL (ref 12–16)
IMM GRANULOCYTES # BLD AUTO: 0.07 X10*3/UL (ref 0–0.5)
IMM GRANULOCYTES NFR BLD AUTO: 1.1 % (ref 0–0.9)
LYMPHOCYTES # BLD AUTO: 1.48 X10*3/UL (ref 0.8–3)
LYMPHOCYTES NFR BLD AUTO: 22.4 %
MCH RBC QN AUTO: 31 PG (ref 26–34)
MCHC RBC AUTO-ENTMCNC: 31.4 G/DL (ref 32–36)
MCV RBC AUTO: 99 FL (ref 80–100)
MONOCYTES # BLD AUTO: 0.64 X10*3/UL (ref 0.05–0.8)
MONOCYTES NFR BLD AUTO: 9.7 %
NEUTROPHILS # BLD AUTO: 4.26 X10*3/UL (ref 1.6–5.5)
NEUTROPHILS NFR BLD AUTO: 64.2 %
NRBC BLD-RTO: 0 /100 WBCS (ref 0–0)
PLATELET # BLD AUTO: 240 X10*3/UL (ref 150–450)
POTASSIUM SERPL-SCNC: 4.1 MMOL/L (ref 3.5–5.3)
PROT SERPL-MCNC: 6.8 G/DL (ref 6.4–8.2)
RBC # BLD AUTO: 4.84 X10*6/UL (ref 4–5.2)
SODIUM SERPL-SCNC: 140 MMOL/L (ref 136–145)
TSH SERPL-ACNC: 2.24 MIU/L (ref 0.44–3.98)
VIT B12 SERPL-MCNC: 468 PG/ML (ref 211–911)
WBC # BLD AUTO: 6.6 X10*3/UL (ref 4.4–11.3)

## 2024-11-22 DIAGNOSIS — R74.8 ELEVATED LIVER ENZYMES: Primary | ICD-10-CM

## 2024-11-26 ENCOUNTER — HOSPITAL ENCOUNTER (OUTPATIENT)
Dept: RADIOLOGY | Facility: CLINIC | Age: 69
Discharge: HOME | End: 2024-11-26
Payer: COMMERCIAL

## 2024-11-26 DIAGNOSIS — R74.8 ELEVATED LIVER ENZYMES: ICD-10-CM

## 2024-11-26 PROCEDURE — 76705 ECHO EXAM OF ABDOMEN: CPT | Performed by: RADIOLOGY

## 2024-11-26 PROCEDURE — 76705 ECHO EXAM OF ABDOMEN: CPT

## 2024-12-02 DIAGNOSIS — R74.8 ELEVATED LIVER ENZYMES: Primary | ICD-10-CM

## 2024-12-11 DIAGNOSIS — G89.4 CHRONIC PAIN SYNDROME: ICD-10-CM

## 2024-12-11 DIAGNOSIS — G25.81 RLS (RESTLESS LEGS SYNDROME): ICD-10-CM

## 2024-12-11 RX ORDER — GABAPENTIN 100 MG/1
100 CAPSULE ORAL SEE ADMIN INSTRUCTIONS
Qty: 630 CAPSULE | Refills: 2 | Status: CANCELLED | OUTPATIENT
Start: 2024-12-11

## 2024-12-11 RX ORDER — GABAPENTIN 400 MG/1
400 CAPSULE ORAL SEE ADMIN INSTRUCTIONS
Qty: 180 CAPSULE | Refills: 1 | Status: SHIPPED | OUTPATIENT
Start: 2024-12-11

## 2025-02-03 LAB
ALBUMIN SERPL-MCNC: 4.4 G/DL (ref 3.6–5.1)
ALBUMIN/GLOB SERPL: 2.2 (CALC) (ref 1–2.5)
ALP SERPL-CCNC: 59 U/L (ref 37–153)
ALT SERPL-CCNC: 12 U/L (ref 6–29)
AST SERPL-CCNC: 21 U/L (ref 10–35)
BILIRUB DIRECT SERPL-MCNC: 0.1 MG/DL
BILIRUB INDIRECT SERPL-MCNC: 0.3 MG/DL (CALC) (ref 0.2–1.2)
BILIRUB SERPL-MCNC: 0.4 MG/DL (ref 0.2–1.2)
GLOBULIN SER CALC-MCNC: 2 G/DL (CALC) (ref 1.9–3.7)
PROT SERPL-MCNC: 6.4 G/DL (ref 6.1–8.1)

## 2025-02-10 ENCOUNTER — APPOINTMENT (OUTPATIENT)
Dept: PRIMARY CARE | Facility: CLINIC | Age: 70
End: 2025-02-10
Payer: COMMERCIAL

## 2025-02-10 VITALS
OXYGEN SATURATION: 96 % | BODY MASS INDEX: 21.16 KG/M2 | WEIGHT: 115 LBS | SYSTOLIC BLOOD PRESSURE: 126 MMHG | HEART RATE: 91 BPM | DIASTOLIC BLOOD PRESSURE: 74 MMHG | HEIGHT: 62 IN

## 2025-02-10 DIAGNOSIS — G89.29 CHRONIC LEFT SI JOINT PAIN: ICD-10-CM

## 2025-02-10 DIAGNOSIS — G25.81 RLS (RESTLESS LEGS SYNDROME): Primary | ICD-10-CM

## 2025-02-10 DIAGNOSIS — G89.4 CHRONIC PAIN SYNDROME: ICD-10-CM

## 2025-02-10 DIAGNOSIS — M53.3 CHRONIC LEFT SI JOINT PAIN: ICD-10-CM

## 2025-02-10 DIAGNOSIS — B00.1 RECURRENT COLD SORES: ICD-10-CM

## 2025-02-10 PROBLEM — R53.83 FATIGUE: Status: RESOLVED | Noted: 2024-11-20 | Resolved: 2025-02-10

## 2025-02-10 PROCEDURE — 1159F MED LIST DOCD IN RCRD: CPT | Performed by: INTERNAL MEDICINE

## 2025-02-10 PROCEDURE — 1157F ADVNC CARE PLAN IN RCRD: CPT | Performed by: INTERNAL MEDICINE

## 2025-02-10 PROCEDURE — 3008F BODY MASS INDEX DOCD: CPT | Performed by: INTERNAL MEDICINE

## 2025-02-10 PROCEDURE — 1160F RVW MEDS BY RX/DR IN RCRD: CPT | Performed by: INTERNAL MEDICINE

## 2025-02-10 PROCEDURE — 99214 OFFICE O/P EST MOD 30 MIN: CPT | Performed by: INTERNAL MEDICINE

## 2025-02-10 PROCEDURE — 1036F TOBACCO NON-USER: CPT | Performed by: INTERNAL MEDICINE

## 2025-02-10 RX ORDER — GABAPENTIN 100 MG/1
400 CAPSULE ORAL SEE ADMIN INSTRUCTIONS
Qty: 720 CAPSULE | Refills: 1 | Status: SHIPPED | OUTPATIENT
Start: 2025-02-10

## 2025-02-10 RX ORDER — NAPROXEN 375 MG/1
375 TABLET ORAL 2 TIMES DAILY PRN
Qty: 60 TABLET | Refills: 5 | Status: SHIPPED | OUTPATIENT
Start: 2025-02-10 | End: 2025-10-08

## 2025-02-10 RX ORDER — ACYCLOVIR 50 MG/G
CREAM TOPICAL
Qty: 5 G | Refills: 3 | Status: SHIPPED | OUTPATIENT
Start: 2025-02-10

## 2025-02-10 ASSESSMENT — ENCOUNTER SYMPTOMS
BACK PAIN: 1
ABDOMINAL PAIN: 0
SHORTNESS OF BREATH: 0
DIZZINESS: 0
FATIGUE: 0

## 2025-02-10 NOTE — PROGRESS NOTES
"Subjective   Patient ID: Nery Adan is a 69 y.o. female who presents for Follow-up chronic medical problems.    Reviewed US and LFTs.  Both now normal.  Was taking tylenol XS 6 tabs daily for feet.  No longer has foot pain since quitting PT work.  Now working 16 hours a week, sedentary.  No problems with RLS.  Doing well with gabapentin and requip.  Prefers gabapentin 100 mg.  Still has pain in SI joint area.  HEP at home.  Has norco at home, 1/2 tab handled pain.  Discussed NSAIDs and tylenol for pain.  Meds and labs reviewed.       Review of Systems   Constitutional:  Negative for fatigue.   Respiratory:  Negative for shortness of breath.    Cardiovascular:  Negative for chest pain.   Gastrointestinal:  Negative for abdominal pain.   Musculoskeletal:  Positive for back pain.   Neurological:  Negative for dizziness.       Objective   /74 (BP Location: Left arm, Patient Position: Sitting)   Pulse 91   Ht 1.575 m (5' 2\")   Wt 52.2 kg (115 lb)   SpO2 96%   BMI 21.03 kg/m²     Physical Exam  Constitutional:       Appearance: Normal appearance.   Neck:      Vascular: No carotid bruit.   Cardiovascular:      Rate and Rhythm: Normal rate and regular rhythm.      Pulses: Normal pulses.      Heart sounds: Normal heart sounds.   Pulmonary:      Effort: Pulmonary effort is normal.      Breath sounds: Normal breath sounds.   Neurological:      General: No focal deficit present.      Mental Status: She is alert.   Psychiatric:         Mood and Affect: Mood normal.         Behavior: Behavior normal.         Thought Content: Thought content normal.         Judgment: Judgment normal.         Assessment/Plan   Problem List Items Addressed This Visit             ICD-10-CM    Chronic left SI joint pain M53.3, G89.29     Discussed naproxen and tylenol XS for pain control.         Relevant Medications    naproxen (Naprosyn) 375 mg tablet    Chronic pain syndrome G89.4     Pain improved with gabapentin.  Continue " HEP.         Relevant Medications    gabapentin (Neurontin) 100 mg capsule    naproxen (Naprosyn) 375 mg tablet    RLS (restless legs syndrome) - Primary G25.81     No leg pain with requip and gabapentin.         Relevant Medications    gabapentin (Neurontin) 100 mg capsule    Recurrent cold sores B00.1    Relevant Medications    acyclovir (Zovirax) 5 % cream

## 2025-02-17 ENCOUNTER — HOSPITAL ENCOUNTER (OUTPATIENT)
Dept: RADIOLOGY | Facility: HOSPITAL | Age: 70
Discharge: HOME | End: 2025-02-17
Payer: MEDICARE

## 2025-02-17 DIAGNOSIS — Z12.39 ENCOUNTER FOR BREAST CANCER SCREENING USING NON-MAMMOGRAM MODALITY: ICD-10-CM

## 2025-02-17 DIAGNOSIS — Z85.3 HISTORY OF BREAST CANCER: ICD-10-CM

## 2025-02-17 PROCEDURE — 2550000001 HC RX 255 CONTRASTS: Performed by: INTERNAL MEDICINE

## 2025-02-17 PROCEDURE — 6100000003 BI MR BREAST BILATERAL WITH CONTRAST FAST SCREENING SELF PAY

## 2025-02-17 PROCEDURE — A9575 INJ GADOTERATE MEGLUMI 0.1ML: HCPCS | Performed by: INTERNAL MEDICINE

## 2025-02-17 RX ORDER — GADOTERATE MEGLUMINE 376.9 MG/ML
10 INJECTION INTRAVENOUS
Status: COMPLETED | OUTPATIENT
Start: 2025-02-17 | End: 2025-02-17

## 2025-02-17 RX ORDER — GADOTERATE MEGLUMINE 376.9 MG/ML
10 INJECTION INTRAVENOUS
Status: CANCELLED | OUTPATIENT
Start: 2025-02-17

## 2025-02-17 RX ADMIN — GADOTERATE MEGLUMINE 10 ML: 376.9 INJECTION INTRAVENOUS at 09:00

## 2025-02-19 ENCOUNTER — TELEPHONE (OUTPATIENT)
Dept: PRIMARY CARE | Facility: CLINIC | Age: 70
End: 2025-02-19
Payer: COMMERCIAL

## 2025-02-19 NOTE — TELEPHONE ENCOUNTER
----- Message from Kevin Jean sent at 2/19/2025 12:43 PM EST -----  MRI of breast negative  Fu next ov

## 2025-04-02 ENCOUNTER — TELEPHONE (OUTPATIENT)
Dept: PRIMARY CARE | Facility: CLINIC | Age: 70
End: 2025-04-02
Payer: MEDICARE

## 2025-04-02 DIAGNOSIS — N39.0 URINARY TRACT INFECTION WITHOUT HEMATURIA, SITE UNSPECIFIED: Primary | ICD-10-CM

## 2025-04-02 RX ORDER — SULFAMETHOXAZOLE AND TRIMETHOPRIM 800; 160 MG/1; MG/1
1 TABLET ORAL 2 TIMES DAILY
Qty: 14 TABLET | Refills: 0 | Status: SHIPPED | OUTPATIENT
Start: 2025-04-02 | End: 2025-04-09

## 2025-04-02 RX ORDER — PHENAZOPYRIDINE HYDROCHLORIDE 100 MG/1
100-200 TABLET, FILM COATED ORAL 3 TIMES DAILY PRN
Qty: 20 TABLET | Refills: 1 | Status: SHIPPED | OUTPATIENT
Start: 2025-04-02 | End: 2025-04-09

## 2025-04-02 NOTE — TELEPHONE ENCOUNTER
I called [facility] to have them fax over visit note and lab , ov and lab work from visit  placed on your desk, please advise

## 2025-04-02 NOTE — TELEPHONE ENCOUNTER
Patient went to urgent care for uti, she is currently being treated for uti with antibiotics , pt states she is still having symptoms, I called [facility] where she was tx to have records faxed over , pt was seen at urgent care 3/31

## 2025-04-13 ENCOUNTER — OFFICE VISIT (OUTPATIENT)
Dept: URGENT CARE | Age: 70
End: 2025-04-13
Payer: COMMERCIAL

## 2025-04-13 VITALS
OXYGEN SATURATION: 95 % | HEART RATE: 76 BPM | HEIGHT: 62 IN | TEMPERATURE: 97.1 F | SYSTOLIC BLOOD PRESSURE: 115 MMHG | DIASTOLIC BLOOD PRESSURE: 75 MMHG | WEIGHT: 112 LBS | BODY MASS INDEX: 20.61 KG/M2 | RESPIRATION RATE: 18 BRPM

## 2025-04-13 DIAGNOSIS — R30.9 URINARY PAIN: Primary | ICD-10-CM

## 2025-04-13 DIAGNOSIS — R30.0 DYSURIA: ICD-10-CM

## 2025-04-13 LAB
POC APPEARANCE, URINE: CLEAR
POC BILIRUBIN, URINE: NEGATIVE
POC BLOOD, URINE: NEGATIVE
POC COLOR, URINE: YELLOW
POC GLUCOSE, URINE: NEGATIVE MG/DL
POC KETONES, URINE: NEGATIVE MG/DL
POC LEUKOCYTES, URINE: NEGATIVE
POC NITRITE,URINE: NEGATIVE
POC PH, URINE: 6 PH
POC PROTEIN, URINE: NEGATIVE MG/DL
POC SPECIFIC GRAVITY, URINE: 1.01
POC UROBILINOGEN, URINE: 0.2 EU/DL

## 2025-04-13 PROCEDURE — 1036F TOBACCO NON-USER: CPT | Performed by: PHYSICIAN ASSISTANT

## 2025-04-13 PROCEDURE — 1159F MED LIST DOCD IN RCRD: CPT | Performed by: PHYSICIAN ASSISTANT

## 2025-04-13 PROCEDURE — 3008F BODY MASS INDEX DOCD: CPT | Performed by: PHYSICIAN ASSISTANT

## 2025-04-13 PROCEDURE — 99213 OFFICE O/P EST LOW 20 MIN: CPT | Performed by: PHYSICIAN ASSISTANT

## 2025-04-13 PROCEDURE — 1157F ADVNC CARE PLAN IN RCRD: CPT | Performed by: PHYSICIAN ASSISTANT

## 2025-04-13 PROCEDURE — 81003 URINALYSIS AUTO W/O SCOPE: CPT | Performed by: PHYSICIAN ASSISTANT

## 2025-04-13 PROCEDURE — 1125F AMNT PAIN NOTED PAIN PRSNT: CPT | Performed by: PHYSICIAN ASSISTANT

## 2025-04-13 ASSESSMENT — ENCOUNTER SYMPTOMS: DYSURIA: 1

## 2025-04-13 ASSESSMENT — PAIN SCALES - GENERAL: PAINLEVEL_OUTOF10: 2

## 2025-04-13 NOTE — PROGRESS NOTES
Subjective   Patient ID: Nery Adan is a 69 y.o. female. They present today with a chief complaint of Difficulty Urinating (Burning during urination finished bactrim last Tuesday. This morning woke up and is in extreme pain. ).    History of Present Illness  Nery is a 69 year old female pmhx breast cancer presents with burning pain on urination and foul smelling urine x 1 day. She did have UTI last week, was RX cipro initially which was subsequently changed to bactrim by her PCP. She did take full course of medication, notes 2 days of symptom resolution, returned yesterday. No abdominal pain, N/V/D. No fevers. Denies vaginal discharge or itching.       Difficulty Urinating      Past Medical History  Allergies as of 04/13/2025 - Reviewed 04/13/2025   Allergen Reaction Noted    Nitrofurantoin Unknown 02/13/2023    Nitrofurantoin monohyd/m-cryst Other 02/13/2023    Requip [ropinirole] Palpitations 05/13/2024       (Not in a hospital admission)       Past Medical History:   Diagnosis Date    Abnormal weight loss 04/29/2020    Weight loss, unintentional    Acute vaginitis 10/17/2019    Acute vaginitis    Breast cancer 01/01/2018    rt mastectomy w/rad&chemo    Breast cyst 1965    Contact with and (suspected) exposure to covid-19 12/14/2020    Suspected COVID-19 virus infection    COVID-19 06/22/2022    COVID-19    Encounter for follow-up examination after completed treatment for conditions other than malignant neoplasm 02/25/2020    Postop check    Follicular disorder, unspecified 04/20/2021    Nasal folliculitis    Headache, unspecified 12/14/2020    Acute nonintractable headache, unspecified headache type    Hx antineoplastic chemo 2018    Left lower quadrant abdominal swelling, mass and lump 05/08/2020    Left lower quadrant abdominal mass    Left lower quadrant pain 05/21/2020    Abdominal pain, LLQ (left lower quadrant)    Mastitis without abscess     Cellulitis of breast    Other conditions influencing  health status 2022    History of cough    Other specified disorders of nose and nasal sinuses 2021    Sore in nose    Pain in right leg 10/25/2021    Right leg pain    Pain in unspecified finger(s) 2021    Thumb pain    Personal history of COVID-19     History of COVID-19    Personal history of irradiation     Personal history of other diseases of the respiratory system 2021    History of nasal discharge    Personal history of other diseases of the respiratory system 2020    History of paranasal sinus congestion    Personal history of other drug therapy     COVID-19 vaccine series completed    Personal history of other infectious and parasitic diseases 2020    History of viral infection    Personal history of other infectious and parasitic diseases 2018    History of candidiasis of mouth    Personal history of other mental and behavioral disorders 10/01/2019    History of anxiety    Personal history of other specified conditions 2021    History of nasal congestion    Personal history of other specified conditions 2019    History of hemoptysis    Personal history of other specified conditions 2020    History of epigastric pain    Postnasal drip 2020    Post-nasal drainage    Unspecified blepharitis left eye, unspecified eyelid 2022    Blepharitis of eyelid of left eye, unspecified eyelid, unspecified type    Unspecified mononeuropathy of left lower limb     Neuropathy of left foot    Unspecified mononeuropathy of right lower limb     Neuropathy of right foot       Past Surgical History:   Procedure Laterality Date    BREAST BIOPSY Left 1967    lt cyst removed    BREAST CYST EXCISION  1965     SECTION, CLASSIC  05/15/2018     Section    COLONOSCOPY  2020    NORMAL F/U 5YEARS    GALLBLADDER SURGERY  05/15/2018    Gallbladder Surgery    GASTRIC FUNDOPLICATION  05/15/2018    Esophagogastric Fundoplasty Nissen  "Fundoplication    MASTECTOMY  2018    OTHER SURGICAL HISTORY  10/30/2019    Right mastectomy    OTHER SURGICAL HISTORY  11/2019    Esophagogastroduodenoscopy    ROTATOR CUFF REPAIR  05/15/2018    Rotator Cuff Repair        reports that she has never smoked. She has never used smokeless tobacco. She reports that she does not currently use alcohol. She reports that she does not use drugs.    Review of Systems  Review of Systems   Genitourinary:  Positive for dysuria.                                  Objective    Vitals:    04/13/25 0959   BP: 115/75   BP Location: Left arm   Patient Position: Sitting   BP Cuff Size: Adult   Pulse: 76   Resp: 18   Temp: 36.2 °C (97.1 °F)   TempSrc: Temporal   SpO2: 95%   Weight: 50.8 kg (112 lb)   Height: 1.575 m (5' 2\")     No LMP recorded. Patient is postmenopausal.    Physical Exam  Vitals and nursing note reviewed.   Constitutional:       Appearance: Normal appearance.   HENT:      Head: Normocephalic and atraumatic.   Cardiovascular:      Rate and Rhythm: Normal rate.      Heart sounds: No murmur heard.  Pulmonary:      Effort: Pulmonary effort is normal.      Breath sounds: No wheezing.   Abdominal:      General: Abdomen is flat. Bowel sounds are normal.      Palpations: Abdomen is soft.      Tenderness: There is no abdominal tenderness. There is no right CVA tenderness, left CVA tenderness or guarding.   Neurological:      Mental Status: She is alert and oriented to person, place, and time.         Procedures    Point of Care Test & Imaging Results from this visit  Results for orders placed or performed in visit on 04/13/25   POCT UA Automated manually resulted   Result Value Ref Range    POC Color, Urine Yellow Straw, Yellow, Light-Yellow    POC Appearance, Urine Clear Clear    POC Glucose, Urine NEGATIVE NEGATIVE mg/dl    POC Bilirubin, Urine NEGATIVE NEGATIVE    POC Ketones, Urine NEGATIVE NEGATIVE mg/dl    POC Specific Gravity, Urine 1.010 1.005 - 1.035    POC Blood, Urine " NEGATIVE NEGATIVE    POC PH, Urine 6.0 No Reference Range Established PH    POC Protein, Urine NEGATIVE NEGATIVE mg/dl    POC Urobilinogen, Urine 0.2 0.2, 1.0 EU/DL    Poc Nitrite, Urine NEGATIVE NEGATIVE    POC Leukocytes, Urine NEGATIVE NEGATIVE      Imaging  No results found.    Cardiology, Vascular, and Other Imaging  No other imaging results found for the past 2 days      Diagnostic study results (if any) were reviewed by Padmini Carias PA-C.    Assessment/Plan   Allergies, medications, history, and pertinent labs/EKGs/Imaging reviewed by Padmini Carias PA-C.     Medical Decision Making  Nery presents with concern for repeat UTI. UA WNL in office, benign clinical exam. No history to suggest vaginal candidiasis. Will send urine for culture. Close PCP follow up. Unfortunately I do not have access to review her prior UA and Urine culture from [facility]. She does have pyridium from a previous RX she can utilize PRN until culture results.  Plan of care discussed with patient and/or family who verbalized understanding. Recommend Follow up with PCP. Advised seeking immediate emergency medical attention if symptoms fail to improve, worsen or any concerning symptoms arise. Patient/Guardian voiced full understanding and agreement to plan.      Orders and Diagnoses  Diagnoses and all orders for this visit:  Urinary pain  -     POCT UA Automated manually resulted  -     Urine Culture  Dysuria      Medical Admin Record      Patient disposition: Home    Electronically signed by Padmini Carias PA-C  11:06 AM

## 2025-04-15 LAB — BACTERIA UR CULT: NORMAL

## 2025-07-21 ENCOUNTER — OFFICE VISIT (OUTPATIENT)
Dept: URGENT CARE | Age: 70
End: 2025-07-21
Payer: MEDICARE

## 2025-07-21 DIAGNOSIS — J01.00 ACUTE NON-RECURRENT MAXILLARY SINUSITIS: Primary | ICD-10-CM

## 2025-07-21 RX ORDER — AZITHROMYCIN 250 MG/1
TABLET, FILM COATED ORAL
Qty: 6 TABLET | Refills: 0 | Status: SHIPPED | OUTPATIENT
Start: 2025-07-21

## 2025-07-21 NOTE — PROGRESS NOTES
Urgent Care Virtual Video Visit    Patient Location: Home  Provider Location: [facility]    Patient presents to urgent care via virtual visit for complaints of sinus pressure and congestion for 10 days. She has been using sudafed and Nasacort. Reports no improvement of symptoms.     Patient alert oriented x 3  Reports maxillary tenderness.  No audible cough or wheezing.    Will start patient on Z-Jose for acute sinusitis.  She was told to continue using over-the-counter medications as needed.  Follow-up with your PCP if symptoms are not improving or worsening in the next week.      I have communicated my name and active licensure. Video visit completed with realtime synchronous video/audio connection. Informed consent was obtained from the patient. Patient was made aware that my evaluation and diagnosis are limited due to the fact that we are not in the same room during the interview and that this is a virtual encounter that took place via videoconferencing. Patient verbalized understanding.     Patient disposition: Home    Electronically signed by GENEVA Warren  8:35 AM

## 2025-08-04 ENCOUNTER — TELEPHONE (OUTPATIENT)
Dept: PRIMARY CARE | Facility: CLINIC | Age: 70
End: 2025-08-04

## 2025-08-04 ENCOUNTER — OFFICE VISIT (OUTPATIENT)
Dept: PRIMARY CARE | Facility: CLINIC | Age: 70
End: 2025-08-04
Payer: MEDICARE

## 2025-08-04 VITALS
DIASTOLIC BLOOD PRESSURE: 74 MMHG | WEIGHT: 111 LBS | SYSTOLIC BLOOD PRESSURE: 112 MMHG | HEIGHT: 62 IN | BODY MASS INDEX: 20.43 KG/M2 | OXYGEN SATURATION: 96 % | HEART RATE: 88 BPM

## 2025-08-04 DIAGNOSIS — R22.0 INTRANASAL MASS: Primary | ICD-10-CM

## 2025-08-04 PROBLEM — H01.00B BLEPHARITIS OF BOTH UPPER AND LOWER EYELID OF LEFT EYE: Status: RESOLVED | Noted: 2023-08-07 | Resolved: 2025-08-04

## 2025-08-04 PROBLEM — H01.006 BLEPHARITIS OF EYELID OF LEFT EYE: Status: RESOLVED | Noted: 2024-11-20 | Resolved: 2025-08-04

## 2025-08-04 PROCEDURE — 99213 OFFICE O/P EST LOW 20 MIN: CPT | Performed by: INTERNAL MEDICINE

## 2025-08-04 PROCEDURE — 1160F RVW MEDS BY RX/DR IN RCRD: CPT | Performed by: INTERNAL MEDICINE

## 2025-08-04 PROCEDURE — 3008F BODY MASS INDEX DOCD: CPT | Performed by: INTERNAL MEDICINE

## 2025-08-04 PROCEDURE — 1159F MED LIST DOCD IN RCRD: CPT | Performed by: INTERNAL MEDICINE

## 2025-08-04 PROCEDURE — 1036F TOBACCO NON-USER: CPT | Performed by: INTERNAL MEDICINE

## 2025-08-04 RX ORDER — LOTILANER OPHTHALMIC SOLUTION 2.5 MG/ML
SOLUTION/ DROPS OPHTHALMIC
COMMUNITY

## 2025-08-04 ASSESSMENT — ENCOUNTER SYMPTOMS
SHORTNESS OF BREATH: 0
DIZZINESS: 0
FATIGUE: 0
SINUS PRESSURE: 0
SINUS PAIN: 0

## 2025-08-04 NOTE — PROGRESS NOTES
"Subjective   Patient ID: Nery Adan is a 69 y.o. female who presents for lump left nostril.    Itching inside of nose.  Noticed a lump on left side.  Has not noticed before.  Able to breath thru nose.  No history of nasal polyps.  No nose bleeds.  Has runny nose since covid last month.  No fever or chills.  Records reviewed.         Review of Systems   Constitutional:  Negative for fatigue.   HENT:  Negative for sinus pressure and sinus pain.    Respiratory:  Negative for shortness of breath.    Cardiovascular:  Negative for chest pain.   Neurological:  Negative for dizziness.       Objective   /74 (BP Location: Left arm, Patient Position: Sitting)   Pulse 88   Ht 1.575 m (5' 2\")   Wt 50.3 kg (111 lb)   SpO2 96%   BMI 20.30 kg/m²     Physical Exam  Constitutional:       Appearance: Normal appearance.   HENT:      Nose:      Comments: Mass left nasal cavity.    Neurological:      General: No focal deficit present.      Mental Status: She is alert.     Psychiatric:         Mood and Affect: Mood normal.         Behavior: Behavior normal.         Thought Content: Thought content normal.         Judgment: Judgment normal.         Assessment/Plan   Problem List Items Addressed This Visit           ICD-10-CM    Intranasal mass - Primary R22.0    Left intranasal mass near opening.  Rec ENT referral.         Relevant Orders    Referral to ENT       Time Spent  Prep time on day of patient encounter: 1 minutes  Time spent directly with patient, family or caregiver: 20 minutes  Additional Time Spent on Patient Care Activities: 0 minutes  Documentation Time: 2 minutes  Other Time Spent: 0 minutes  Total: 23 minutes      "

## 2025-08-06 ENCOUNTER — HOSPITAL ENCOUNTER (OUTPATIENT)
Dept: RADIOLOGY | Facility: CLINIC | Age: 70
Discharge: HOME | End: 2025-08-06
Payer: MEDICARE

## 2025-08-06 VITALS — HEIGHT: 62 IN | WEIGHT: 110.89 LBS | BODY MASS INDEX: 20.41 KG/M2

## 2025-08-06 DIAGNOSIS — Z12.31 ENCOUNTER FOR SCREENING MAMMOGRAM FOR MALIGNANT NEOPLASM OF BREAST: ICD-10-CM

## 2025-08-06 PROCEDURE — 77067 SCR MAMMO BI INCL CAD: CPT | Mod: LEFT SIDE | Performed by: RADIOLOGY

## 2025-08-06 PROCEDURE — 77067 SCR MAMMO BI INCL CAD: CPT | Mod: 52,LT

## 2025-08-06 PROCEDURE — 77063 BREAST TOMOSYNTHESIS BI: CPT | Mod: LEFT SIDE | Performed by: RADIOLOGY

## 2025-08-07 ENCOUNTER — APPOINTMENT (OUTPATIENT)
Dept: PRIMARY CARE | Facility: CLINIC | Age: 70
End: 2025-08-07
Payer: COMMERCIAL

## 2025-08-25 ENCOUNTER — APPOINTMENT (OUTPATIENT)
Dept: OTOLARYNGOLOGY | Facility: CLINIC | Age: 70
End: 2025-08-25
Payer: MEDICARE

## 2025-08-25 DIAGNOSIS — J34.2 DEVIATED NASAL SEPTUM: ICD-10-CM

## 2025-08-25 DIAGNOSIS — J34.89 NASAL DRYNESS: ICD-10-CM

## 2025-08-25 DIAGNOSIS — J34.89 NASAL LESION: Primary | ICD-10-CM

## 2025-08-25 DIAGNOSIS — J34.3 HYPERTROPHY OF BOTH INFERIOR NASAL TURBINATES: ICD-10-CM

## 2025-08-25 PROCEDURE — 1036F TOBACCO NON-USER: CPT | Performed by: STUDENT IN AN ORGANIZED HEALTH CARE EDUCATION/TRAINING PROGRAM

## 2025-08-25 PROCEDURE — 31231 NASAL ENDOSCOPY DX: CPT | Performed by: STUDENT IN AN ORGANIZED HEALTH CARE EDUCATION/TRAINING PROGRAM

## 2025-08-25 PROCEDURE — 99203 OFFICE O/P NEW LOW 30 MIN: CPT | Performed by: STUDENT IN AN ORGANIZED HEALTH CARE EDUCATION/TRAINING PROGRAM

## 2025-08-25 PROCEDURE — 1159F MED LIST DOCD IN RCRD: CPT | Performed by: STUDENT IN AN ORGANIZED HEALTH CARE EDUCATION/TRAINING PROGRAM

## 2025-10-07 ENCOUNTER — APPOINTMENT (OUTPATIENT)
Dept: PRIMARY CARE | Facility: CLINIC | Age: 70
End: 2025-10-07
Payer: MEDICARE

## 2025-11-25 ENCOUNTER — APPOINTMENT (OUTPATIENT)
Dept: OTOLARYNGOLOGY | Facility: CLINIC | Age: 70
End: 2025-11-25
Payer: MEDICARE